# Patient Record
Sex: FEMALE | Race: WHITE | NOT HISPANIC OR LATINO
[De-identification: names, ages, dates, MRNs, and addresses within clinical notes are randomized per-mention and may not be internally consistent; named-entity substitution may affect disease eponyms.]

---

## 2020-05-20 ENCOUNTER — APPOINTMENT (OUTPATIENT)
Dept: RHEUMATOLOGY | Facility: CLINIC | Age: 70
End: 2020-05-20
Payer: MEDICARE

## 2020-05-20 VITALS
HEIGHT: 65 IN | SYSTOLIC BLOOD PRESSURE: 158 MMHG | DIASTOLIC BLOOD PRESSURE: 86 MMHG | OXYGEN SATURATION: 96 % | BODY MASS INDEX: 26.16 KG/M2 | WEIGHT: 157 LBS | HEART RATE: 78 BPM

## 2020-05-20 DIAGNOSIS — L40.50 ARTHROPATHIC PSORIASIS, UNSPECIFIED: ICD-10-CM

## 2020-05-20 PROCEDURE — 99204 OFFICE O/P NEW MOD 45 MIN: CPT

## 2020-05-22 ENCOUNTER — APPOINTMENT (OUTPATIENT)
Dept: RADIOLOGY | Facility: CLINIC | Age: 70
End: 2020-05-22
Payer: MEDICARE

## 2020-05-22 ENCOUNTER — OUTPATIENT (OUTPATIENT)
Dept: OUTPATIENT SERVICES | Facility: HOSPITAL | Age: 70
LOS: 1 days | End: 2020-05-22
Payer: MEDICARE

## 2020-05-22 ENCOUNTER — TRANSCRIPTION ENCOUNTER (OUTPATIENT)
Age: 70
End: 2020-05-22

## 2020-05-22 ENCOUNTER — RESULT REVIEW (OUTPATIENT)
Age: 70
End: 2020-05-22

## 2020-05-22 DIAGNOSIS — Z90.710 ACQUIRED ABSENCE OF BOTH CERVIX AND UTERUS: Chronic | ICD-10-CM

## 2020-05-22 DIAGNOSIS — L40.50 ARTHROPATHIC PSORIASIS, UNSPECIFIED: ICD-10-CM

## 2020-05-22 PROCEDURE — 73130 X-RAY EXAM OF HAND: CPT | Mod: 26,50

## 2020-05-22 PROCEDURE — 73130 X-RAY EXAM OF HAND: CPT

## 2020-05-22 PROCEDURE — 73630 X-RAY EXAM OF FOOT: CPT | Mod: 26,50

## 2020-05-22 PROCEDURE — 72040 X-RAY EXAM NECK SPINE 2-3 VW: CPT | Mod: 26

## 2020-05-22 PROCEDURE — 72110 X-RAY EXAM L-2 SPINE 4/>VWS: CPT

## 2020-05-22 PROCEDURE — 73630 X-RAY EXAM OF FOOT: CPT

## 2020-05-22 PROCEDURE — 72110 X-RAY EXAM L-2 SPINE 4/>VWS: CPT | Mod: 26

## 2020-05-22 PROCEDURE — 72040 X-RAY EXAM NECK SPINE 2-3 VW: CPT

## 2020-05-25 LAB
25(OH)D3 SERPL-MCNC: 50.8 NG/ML
ALBUMIN SERPL ELPH-MCNC: 4.8 G/DL
ALP BLD-CCNC: 126 U/L
ALT SERPL-CCNC: 29 U/L
ANA SER IF-ACNC: NEGATIVE
ANION GAP SERPL CALC-SCNC: 15 MMOL/L
AST SERPL-CCNC: 28 U/L
BASOPHILS # BLD AUTO: 0.07 K/UL
BASOPHILS NFR BLD AUTO: 1.1 %
BILIRUB SERPL-MCNC: 0.3 MG/DL
BUN SERPL-MCNC: 15 MG/DL
CALCIUM SERPL-MCNC: 10.2 MG/DL
CCP AB SER IA-ACNC: <8 UNITS
CHLORIDE SERPL-SCNC: 100 MMOL/L
CK SERPL-CCNC: 174 U/L
CO2 SERPL-SCNC: 26 MMOL/L
CREAT SERPL-MCNC: 0.87 MG/DL
CREAT SPEC-SCNC: 28 MG/DL
CREAT/PROT UR: NORMAL RATIO
CRP SERPL-MCNC: 0.13 MG/DL
DEPRECATED KAPPA LC FREE/LAMBDA SER: 0.91 RATIO
DSDNA AB SER-ACNC: <12 IU/ML
ENA RNP AB SER IA-ACNC: 0.2 AL
ENA SM AB SER IA-ACNC: <0.2 AL
ENA SS-A AB SER IA-ACNC: <0.2 AL
ENA SS-B AB SER IA-ACNC: <0.2 AL
EOSINOPHIL # BLD AUTO: 0.23 K/UL
EOSINOPHIL NFR BLD AUTO: 3.5 %
ERYTHROCYTE [SEDIMENTATION RATE] IN BLOOD BY WESTERGREN METHOD: 7 MM/HR
GLUCOSE SERPL-MCNC: 85 MG/DL
HAV IGM SER QL: NONREACTIVE
HBV CORE IGG+IGM SER QL: NONREACTIVE
HBV CORE IGM SER QL: NONREACTIVE
HBV SURFACE AG SER QL: NONREACTIVE
HCT VFR BLD CALC: 45.1 %
HCV AB SER QL: NONREACTIVE
HCV S/CO RATIO: 0.28 S/CO
HGB BLD-MCNC: 14.9 G/DL
IGA SER QL IEP: 181 MG/DL
IGG SER QL IEP: 1032 MG/DL
IGM SER QL IEP: 152 MG/DL
IMM GRANULOCYTES NFR BLD AUTO: 0.3 %
KAPPA LC CSF-MCNC: 1.64 MG/DL
KAPPA LC SERPL-MCNC: 1.5 MG/DL
LYMPHOCYTES # BLD AUTO: 2.61 K/UL
LYMPHOCYTES NFR BLD AUTO: 39.7 %
M TB IFN-G BLD-IMP: NEGATIVE
MAN DIFF?: NORMAL
MCHC RBC-ENTMCNC: 30.7 PG
MCHC RBC-ENTMCNC: 33 GM/DL
MCV RBC AUTO: 92.8 FL
MONOCYTES # BLD AUTO: 0.98 K/UL
MONOCYTES NFR BLD AUTO: 14.9 %
NEUTROPHILS # BLD AUTO: 2.67 K/UL
NEUTROPHILS NFR BLD AUTO: 40.5 %
PLATELET # BLD AUTO: 253 K/UL
POTASSIUM SERPL-SCNC: 4.2 MMOL/L
PROT SERPL-MCNC: 7.6 G/DL
PROT UR-MCNC: <4 MG/DL
QUANTIFERON TB PLUS MITOGEN MINUS NIL: 6.53 IU/ML
QUANTIFERON TB PLUS NIL: 0.01 IU/ML
QUANTIFERON TB PLUS TB1 MINUS NIL: 0 IU/ML
QUANTIFERON TB PLUS TB2 MINUS NIL: 0 IU/ML
RBC # BLD: 4.86 M/UL
RBC # FLD: 12 %
RF+CCP IGG SER-IMP: NEGATIVE
RHEUMATOID FACT SER QL: <10 IU/ML
SODIUM SERPL-SCNC: 141 MMOL/L
THYROGLOB AB SERPL-ACNC: <20 IU/ML
THYROPEROXIDASE AB SERPL IA-ACNC: <10 IU/ML
WBC # FLD AUTO: 6.58 K/UL

## 2020-05-26 LAB — HLA-B27 RELATED AG QL: NORMAL

## 2020-06-04 NOTE — PHYSICAL EXAM
[General Appearance - Alert] : alert [General Appearance - In No Acute Distress] : in no acute distress [Sclera] : the sclera and conjunctiva were normal [PERRL With Normal Accommodation] : pupils were equal in size, round, and reactive to light [Extraocular Movements] : extraocular movements were intact [Outer Ear] : the ears and nose were normal in appearance [Oropharynx] : the oropharynx was normal [Neck Cervical Mass (___cm)] : no neck mass was observed [Neck Appearance] : the appearance of the neck was normal [Jugular Venous Distention Increased] : there was no jugular-venous distention [Thyroid Diffuse Enlargement] : the thyroid was not enlarged [Thyroid Nodule] : there were no palpable thyroid nodules [Auscultation Breath Sounds / Voice Sounds] : lungs were clear to auscultation bilaterally [Heart Sounds] : normal S1 and S2 [Heart Rate And Rhythm] : heart rate was normal and rhythm regular [Heart Sounds Gallop] : no gallops [Murmurs] : no murmurs [Heart Sounds Pericardial Friction Rub] : no pericardial rub [Cervical Lymph Nodes Enlarged Posterior Bilaterally] : posterior cervical [Cervical Lymph Nodes Enlarged Anterior Bilaterally] : anterior cervical [Axillary Lymph Nodes Enlarged Bilaterally] : axillary [Supraclavicular Lymph Nodes Enlarged Bilaterally] : supraclavicular [No CVA Tenderness] : no ~M costovertebral angle tenderness [No Spinal Tenderness] : no spinal tenderness [FreeTextEntry1] : OA changes in the hands. TTP over the joints diffusely and on enthesis [Skin Color & Pigmentation] : normal skin color and pigmentation [Skin Turgor] : normal skin turgor [] : no rash [No Focal Deficits] : no focal deficits [Oriented To Time, Place, And Person] : oriented to person, place, and time [Impaired Insight] : insight and judgment were intact [Affect] : the affect was normal

## 2020-06-04 NOTE — HISTORY OF PRESENT ILLNESS
[FreeTextEntry1] : 69 year old female here for evaluation of PsA\par \galileo Has always had pain - started in the R hand, neck, shoulders, ankles and feet. She went to Dr. Collier in [place] - started Cimzia for PsA. Did labs and looking at her hands felt that she had PsA. Did not do x-rays. Took 4 injections over 5 months and did not think it was working. She felt there was not enough care being given. \par \par Patient transferred care to Dr. Franklin - did x-rays with possible evidence of PsA in 2019. Started Otezla 30 mg BID - didn't;t think it worked either. Switched to Taltz injection after 5 months - has taken 8 injections thus far. Last one was in 2020. At that time got blisters all over her stomach appr. 5 inches and painful. Feels that now that she iff the medication, her L hand seems to be worsening with pain and stiffness. Has had trouble getting medications due to expense of the drug and insurance coverage. \par \galileo Has pain in the neck and shoulder worse at night, wakes her up at night but not a/w morning stiffness. R hand >> L hand mostly over the CMC joints, PIPs, DIPs. Also c/o bilateral hip around the groin and buttocks. Did MRI of the hips which showed OA back in 2020. Toes and ankles feel numb when she wakes up in the morning. Also c/o severe fatigue all the time. Has flare ups with pain all over the body that occurs periodically - lasts 24 hours and resolved over time. No related to weather but could be related to stress. No dactylitis. Has not had any joint swelling. \par \galileo Does not have known h/o psoriasis. Many years ago, had a patch on the distal L LE which resolved on its own. Her doctor at the time thought it may have been psoriasis. Never had any rashes since then. Dr. Collier has suggested to the patient that she may have had patches on the elbow but the patient does not recall the lesions. \par \galileo Has had fractures with delayed healing. DEXA was done in 2020 and showed osteoporosis - was recommended to start Prolia but patient is hesitant. \par \par Ob/Gyn H: A0. Healthy pregnancy. menopause in  s/p hysterectomy for endometriosis. \par \par HCM: Mammogram: up to date; Colonoscopy: up to date \par \par  [Currently Experiencing] : currently [Anorexia] : no anorexia [Weight Loss] : no weight loss [Malaise] : malaise [Fever] : no fever [Chills] : no chills [Fatigue] : fatigue [Depression] : no depression [Malar Facial Rash] : no malar facial rash [Skin Lesions] : no lesions [Skin Nodules] : no skin nodules [Oral Ulcers] : no oral ulcers [Cough] : no cough [Dry Mouth] : no dry mouth [Dysphonia] : no dysphonia [Dysphagia] : no dysphagia [Shortness of Breath] : no shortness of breath [Chest Pain] : no chest pain [Arthralgias] : arthralgias [Joint Swelling] : no joint swelling [Joint Warmth] : no joint warmth [Joint Deformity] : no joint deformity [Decreased ROM] : decreased range of motion [Morning Stiffness] : no morning stiffness [Falls] : no falls [Difficulty Standing] : no difficulty standing [Difficulty Walking] : difficulty walking [Dyspnea] : no dyspnea [Myalgias] : no myalgias [Muscle Weakness] : no muscle weakness [Muscle Spasms] : no muscle spasms [Muscle Cramping] : no muscle cramping [Visual Changes] : no visual changes [Eye Pain] : no eye pain [Eye Redness] : no eye redness [Dry Eyes] : no dry eyes

## 2020-06-04 NOTE — ASSESSMENT
[FreeTextEntry1] : 19 year old female here for evaluation of possible PsA\par \par 1. Possible PsA: has one instance of rash (possibly psoriasis but never confirmed) and recent joint pains which may be related to OA or enthesitis or both. No evidence of inflammatory arthritis or dactylitis. Advised to bring in prior MRIs for review. Will get labs and x-rays today.  Depending on findings, will consider treatment strategy - likely will address her OA symptoms first. \par \par 2. Bone health: reviewed bone density with patient. Discussed the possibility of starting BPP - patient will think about it. \par \par Follow up in 2 months

## 2020-06-04 NOTE — REVIEW OF SYSTEMS
[As Noted in HPI] : as noted in HPI [Arthralgias] : arthralgias [Joint Pain] : joint pain [Joint Swelling] : no joint swelling [Joint Stiffness] : joint stiffness [Limb Pain] : no limb pain [Limb Swelling] : no limb swelling [Negative] : Heme/Lymph

## 2020-06-09 ENCOUNTER — APPOINTMENT (OUTPATIENT)
Dept: MRI IMAGING | Facility: CLINIC | Age: 70
End: 2020-06-09
Payer: MEDICARE

## 2020-06-09 ENCOUNTER — RESULT REVIEW (OUTPATIENT)
Age: 70
End: 2020-06-09

## 2020-06-09 ENCOUNTER — OUTPATIENT (OUTPATIENT)
Dept: OUTPATIENT SERVICES | Facility: HOSPITAL | Age: 70
LOS: 1 days | End: 2020-06-09
Payer: MEDICARE

## 2020-06-09 DIAGNOSIS — L40.50 ARTHROPATHIC PSORIASIS, UNSPECIFIED: ICD-10-CM

## 2020-06-09 DIAGNOSIS — Z90.710 ACQUIRED ABSENCE OF BOTH CERVIX AND UTERUS: Chronic | ICD-10-CM

## 2020-06-09 PROCEDURE — 73721 MRI JNT OF LWR EXTRE W/O DYE: CPT | Mod: 26,LT

## 2020-06-09 PROCEDURE — 73718 MRI LOWER EXTREMITY W/O DYE: CPT | Mod: 26,LT

## 2020-06-09 PROCEDURE — 73721 MRI JNT OF LWR EXTRE W/O DYE: CPT

## 2020-06-09 PROCEDURE — 73718 MRI LOWER EXTREMITY W/O DYE: CPT

## 2020-06-23 ENCOUNTER — APPOINTMENT (OUTPATIENT)
Dept: RHEUMATOLOGY | Facility: CLINIC | Age: 70
End: 2020-06-23
Payer: MEDICARE

## 2020-06-23 VITALS
HEART RATE: 71 BPM | DIASTOLIC BLOOD PRESSURE: 78 MMHG | BODY MASS INDEX: 25.83 KG/M2 | RESPIRATION RATE: 16 BRPM | OXYGEN SATURATION: 97 % | WEIGHT: 155 LBS | HEIGHT: 65 IN | TEMPERATURE: 97.5 F | SYSTOLIC BLOOD PRESSURE: 127 MMHG

## 2020-06-23 DIAGNOSIS — M25.50 PAIN IN UNSPECIFIED JOINT: ICD-10-CM

## 2020-06-23 PROCEDURE — 99214 OFFICE O/P EST MOD 30 MIN: CPT

## 2020-06-23 NOTE — HISTORY OF PRESENT ILLNESS
[___ Month(s) Ago] : [unfilled] month(s) ago [Currently Experiencing] : currently [Fatigue] : fatigue [Arthralgias] : arthralgias [Decreased ROM] : decreased range of motion [Morning Stiffness] : morning stiffness [Anorexia] : no anorexia [Weight Loss] : no weight loss [Malaise] : no malaise [Fever] : no fever [Chills] : no chills [Depression] : no depression [Malar Facial Rash] : no malar facial rash [Skin Lesions] : no lesions [Skin Nodules] : no skin nodules [Oral Ulcers] : no oral ulcers [Cough] : no cough [Dry Mouth] : no dry mouth [Dysphonia] : no dysphonia [Dysphagia] : no dysphagia [Shortness of Breath] : no shortness of breath [Chest Pain] : no chest pain [Joint Swelling] : no joint swelling [Joint Warmth] : no joint warmth [Joint Deformity] : no joint deformity [Falls] : no falls [Difficulty Standing] : no difficulty standing [Difficulty Walking] : no difficulty walking [Dyspnea] : no dyspnea [Myalgias] : no myalgias [Muscle Spasms] : no muscle spasms [Muscle Weakness] : no muscle weakness [Muscle Cramping] : no muscle cramping [Eye Pain] : no eye pain [Visual Changes] : no visual changes [Eye Redness] : no eye redness [Dry Eyes] : no dry eyes [FreeTextEntry1] : Persistent achiness in all the joints particularly in the ankles, feet and hands.  Symptoms are intermittent. Notices some swelling around the ankles and feet but not in the hand joints. Reviewed MRI of the ankle with the patient.

## 2020-06-23 NOTE — PHYSICAL EXAM
[General Appearance - Alert] : alert [General Appearance - In No Acute Distress] : in no acute distress [Sclera] : the sclera and conjunctiva were normal [PERRL With Normal Accommodation] : pupils were equal in size, round, and reactive to light [Extraocular Movements] : extraocular movements were intact [Outer Ear] : the ears and nose were normal in appearance [Oropharynx] : the oropharynx was normal [Neck Appearance] : the appearance of the neck was normal [Neck Cervical Mass (___cm)] : no neck mass was observed [Jugular Venous Distention Increased] : there was no jugular-venous distention [Thyroid Diffuse Enlargement] : the thyroid was not enlarged [Thyroid Nodule] : there were no palpable thyroid nodules [Auscultation Breath Sounds / Voice Sounds] : lungs were clear to auscultation bilaterally [Heart Rate And Rhythm] : heart rate was normal and rhythm regular [Heart Sounds] : normal S1 and S2 [Heart Sounds Gallop] : no gallops [Murmurs] : no murmurs [Heart Sounds Pericardial Friction Rub] : no pericardial rub [Cervical Lymph Nodes Enlarged Posterior Bilaterally] : posterior cervical [Cervical Lymph Nodes Enlarged Anterior Bilaterally] : anterior cervical [Supraclavicular Lymph Nodes Enlarged Bilaterally] : supraclavicular [Axillary Lymph Nodes Enlarged Bilaterally] : axillary [No CVA Tenderness] : no ~M costovertebral angle tenderness [No Spinal Tenderness] : no spinal tenderness [Skin Color & Pigmentation] : normal skin color and pigmentation [Skin Turgor] : normal skin turgor [] : no rash [No Focal Deficits] : no focal deficits [Oriented To Time, Place, And Person] : oriented to person, place, and time [Impaired Insight] : insight and judgment were intact [Affect] : the affect was normal [FreeTextEntry1] : OA changes in the hands. TTP over the joints diffusely and on enthesis

## 2020-06-23 NOTE — ASSESSMENT
[FreeTextEntry1] : 69 year old female here for evaluation of possible PsA\par \par 1. Possible PsA: has one instance of rash (possibly psoriasis but never confirmed) and recent joint pains which may be related to OA or enthesitis or both. No evidence of inflammatory arthritis or dactylitis. Will review the MRIs she brought in today. No erosions or joint damage on x-ray. On MRI of the L ankle and foot there is evidence of OA and entheseal changes that possibly reflects enthesitis but still unlikely to be PsA.  Will currently address her OA pain - start Gabapentin 100 mg TID and increase gradually to goal of 300 mg TID. Risks, benefits and side effects of the medication discussed with the patient in detail. Advised to call me if she experience any side effects\par \par 2. Bone health: reviewed bone density with patient. Discussed the possibility of starting BPP - patient will think about it. \par \par Follow up in 1 month

## 2020-06-23 NOTE — DATA REVIEWED
[FreeTextEntry1] : MRI L Foot (06/2020): MUSCLES AND TENDONS: The tendons are intact. No full-thickness tendon tear \par or retraction is noted. The intrinsic musculature the foot is preserved. No muscle edema noted. \par PLANTAR FASCIA: The distal plantar fascia is preserved. LIGAMENTS: Intact Lisfranc ligament. Mild attenuation of the medial plantar plate of the third digit. No acute plantar plate tear. CARTILAGE and SUBCHONDRAL BONE: Moderate to severe degenerative changes the first metatarsophalangeal joint. Several nonspecific cysts are seen at the medial first metatarsal head. Some of these may reflect periarticular erosions. There is cartilage loss and subchondral cystic change at the third TMT \par SYNOVIUM/JOINT FLUID: There is a small first metatarsophalangeal joint effusion. Small fluid present from the second through fifth metatarsophalangeal joints. Intermetatarsal bursitis noted at the first through third web spaces. MARROW: There is no fracture. NEUROVASCULAR STRUCTURES: Preserved PERIPHERAL/ SUBCUTANEOUS SOFT TISSUES: No significant soft tissue swelling. IMPRESSION: 1. Severe DJD at the first MTP. Additional degenerative changes as above. 2. Possible periarticular erosion at the first metatarsal head that can be seen with an inflammatory arthropathy such as gout. Correlate clinically. \par \par L ankle MRI (06/2020): Bones: There is mild subchondral signal abnormality at the lateral talar \par dome with an overlying chondral fissure. Joints: There is mild third and fourth tarsometatarsal osteoarthritis. There is scarring within the sinus Tarsi with a 1.8 x 1.0 cm ganglion cyst extending dorsal laterally which contacts the undersurface of the extensor hallucis longus and digitorum tendons (9, 17 and 5, 16 and 18). Ligaments: Anterior talofibular, calcaneofibular and posterior talofibular ligaments are intact. The distal syndesmotic ligaments are intact. The deltoid ligament is intact. Muscles and Tendons: There is short segment chronic longitudinal split tearing of peroneal brevis at the lateral malleolus (4, 20) with reconstitution at the level the distal calcaneus. The medial long flexor and anterior extensor tendons appear unremarkable. The Achilles tendon is intact. Plantar Fascia: Minimal intermediate T2 signal within the middle cord of the plantar fascia at the origin laterally without thickening of the middle cord likely related to a small subacute to chronic tear. Thickening of the middle cord of the plantar fascia 2.6 cm from the origin which is also chronic. There is no adjacent soft tissue edema. Neurovascular: Normal. \par Soft Tissues: Mild lateral greater than medial subcutaneous edema. Impression: Scarring at the sinus Tarsi with a 1.8 x 1.0 cm ganglion cyst extending dorsal laterally which contacts the undersurface of the extensor hallucis longus and extensor digitorum tendons. Chronic short segment longitudinal split tear of peroneal brevis. Intermediate intrasubstance signal at the origin the middle cord of the plantar fascia with mild thickening distally which is likely subacute to chronic and chronic respectively. Chondral fissure with mild subchondral signal abnormality at the lateral talar dome. Mild third and fourth tarsometatarsal osteoarthrosis. \par \par Bilateral foor x-ray (05/2020): No fractures or dislocations. Tarsometatarsal alignment maintained without evidence for a Lisfranc injury. Bilateral hallux valgus deformities with associated bunions. Suggestion of a \par tiny well marginated erosion along left 1st MTP distal lateral articular margin on the frontal view. Otherwise preserved visualized joint spaces and no additional suspected joint margin erosions. No lytic or blastic lesions. \par \par Hand x-ray (05/2020): No fractures or dislocations. Varying degrees of osteoarthritis involving the bilateral STT 1st CMC and right IP joints. Preserved remaining joint spaces and no joint margin erosions. \par Carpal bones normally aligned. Neutral ulnar variance. Generalized osteopenia otherwise no discrete lytic or blastic lesions. Cervical Spine x-ray (05/2020): No compression fractures. Multilevel mid and lower cervical predominant degenerative disease manifest by varying degrees of disc space narrowing with disc margin osteophyte formation and posterior facet arthrosis. Degree of degenerative disc changes \par most severe at C5-C6 C6-C7 and C7-T1 levels. Straightened lordosis could be related to muscle spasm and/or the underlying degenerative change. Slight C3 on C4 and C4 on C5 anterolisthesis. Remaining vertebral body alignment maintained. Prevertebral soft tissues and predental interval within normal limits. \par Atlantoaxial and posterior facet alignment maintained. Intact odontoid. Generalized osteopenia otherwise no discrete lytic or blastic lesions. Clear visualized lung apices and midline normal caliber trachea. \par \par Lumbar spine x-ray (05/2020): Broad slight lumbar levocurvature. No compression fractures. Multilevel degenerative disease manifest by varying degrees of disc space narrowing with disc margin osteophyte formation and posterior facet arthrosis. Grade 1 anterolisthesis of L3 on L4, grade 1-2 anterolisthesis of L4 on L5, and slight L1 on L2 and L2 on L3 retrolistheses however without associated spondylolysis defects. Remaining vertebral body alignment maintained. Unremarkable SI joints and partially visualized hips. No lytic or blastic lesions. Right upper quadrant surgical clips present. \par

## 2020-06-23 NOTE — REVIEW OF SYSTEMS
[As Noted in HPI] : as noted in HPI [Arthralgias] : arthralgias [Joint Pain] : joint pain [Joint Stiffness] : joint stiffness [Negative] : Heme/Lymph [Joint Swelling] : no joint swelling [Limb Pain] : no limb pain [Limb Swelling] : no limb swelling

## 2020-06-29 ENCOUNTER — APPOINTMENT (OUTPATIENT)
Dept: RHEUMATOLOGY | Facility: CLINIC | Age: 70
End: 2020-06-29

## 2020-07-07 ENCOUNTER — APPOINTMENT (OUTPATIENT)
Dept: RHEUMATOLOGY | Facility: CLINIC | Age: 70
End: 2020-07-07

## 2020-07-29 ENCOUNTER — APPOINTMENT (OUTPATIENT)
Dept: RHEUMATOLOGY | Facility: CLINIC | Age: 70
End: 2020-07-29
Payer: MEDICARE

## 2020-07-29 VITALS
WEIGHT: 157 LBS | DIASTOLIC BLOOD PRESSURE: 98 MMHG | SYSTOLIC BLOOD PRESSURE: 153 MMHG | HEART RATE: 82 BPM | BODY MASS INDEX: 26.16 KG/M2 | OXYGEN SATURATION: 98 % | HEIGHT: 65 IN | TEMPERATURE: 97.7 F

## 2020-07-29 PROCEDURE — 99214 OFFICE O/P EST MOD 30 MIN: CPT

## 2020-07-29 RX ORDER — GABAPENTIN 100 MG/1
100 CAPSULE ORAL
Qty: 90 | Refills: 1 | Status: DISCONTINUED | COMMUNITY
Start: 2020-06-23 | End: 2020-07-29

## 2020-07-29 NOTE — HISTORY OF PRESENT ILLNESS
[Currently Experiencing] : currently [___ Month(s) Ago] : [unfilled] month(s) ago [Fatigue] : fatigue [Arthralgias] : arthralgias [Decreased ROM] : decreased range of motion [Morning Stiffness] : morning stiffness [Anorexia] : no anorexia [Weight Loss] : no weight loss [Malaise] : no malaise [Fever] : no fever [Chills] : no chills [Depression] : no depression [Malar Facial Rash] : no malar facial rash [Skin Lesions] : no lesions [Skin Nodules] : no skin nodules [Oral Ulcers] : no oral ulcers [Cough] : no cough [Dry Mouth] : no dry mouth [Dysphonia] : no dysphonia [Shortness of Breath] : no shortness of breath [Dysphagia] : no dysphagia [Chest Pain] : no chest pain [Joint Swelling] : no joint swelling [Joint Deformity] : no joint deformity [Joint Warmth] : no joint warmth [Falls] : no falls [Difficulty Walking] : no difficulty walking [Difficulty Standing] : no difficulty standing [Dyspnea] : no dyspnea [Myalgias] : no myalgias [Muscle Weakness] : no muscle weakness [Muscle Cramping] : no muscle cramping [Muscle Spasms] : no muscle spasms [Eye Pain] : no eye pain [Visual Changes] : no visual changes [Eye Redness] : no eye redness [Dry Eyes] : no dry eyes [FreeTextEntry1] : Could not tolerate Gabapentin. COntinues ot have pain in the joints but has been using CBD oil which has helped with the pain. Also exercises twice a week.

## 2020-07-29 NOTE — PHYSICAL EXAM
[General Appearance - Alert] : alert [PERRL With Normal Accommodation] : pupils were equal in size, round, and reactive to light [Sclera] : the sclera and conjunctiva were normal [General Appearance - In No Acute Distress] : in no acute distress [Extraocular Movements] : extraocular movements were intact [Outer Ear] : the ears and nose were normal in appearance [Oropharynx] : the oropharynx was normal [Neck Appearance] : the appearance of the neck was normal [Jugular Venous Distention Increased] : there was no jugular-venous distention [Neck Cervical Mass (___cm)] : no neck mass was observed [Thyroid Diffuse Enlargement] : the thyroid was not enlarged [Auscultation Breath Sounds / Voice Sounds] : lungs were clear to auscultation bilaterally [Thyroid Nodule] : there were no palpable thyroid nodules [Heart Rate And Rhythm] : heart rate was normal and rhythm regular [Heart Sounds Gallop] : no gallops [Heart Sounds] : normal S1 and S2 [Murmurs] : no murmurs [Heart Sounds Pericardial Friction Rub] : no pericardial rub [Cervical Lymph Nodes Enlarged Posterior Bilaterally] : posterior cervical [Cervical Lymph Nodes Enlarged Anterior Bilaterally] : anterior cervical [Axillary Lymph Nodes Enlarged Bilaterally] : axillary [No CVA Tenderness] : no ~M costovertebral angle tenderness [Supraclavicular Lymph Nodes Enlarged Bilaterally] : supraclavicular [No Spinal Tenderness] : no spinal tenderness [Skin Color & Pigmentation] : normal skin color and pigmentation [Skin Turgor] : normal skin turgor [] : no rash [Impaired Insight] : insight and judgment were intact [No Focal Deficits] : no focal deficits [Oriented To Time, Place, And Person] : oriented to person, place, and time [Affect] : the affect was normal [FreeTextEntry1] : OA changes in the hands. TTP over the joints diffusely and on enthesis

## 2020-07-29 NOTE — ASSESSMENT
[FreeTextEntry1] : 70 year old female here for evaluation of possible PsA\par \par 1. Possible PsA: has one instance of rash (possibly psoriasis but never confirmed) and recent joint pains which may be related to OA or enthesitis or both. No evidence of inflammatory arthritis or dactylitis. Will review the MRIs she brought in today. No erosions or joint damage on x-ray. On MRI of the L ankle and foot there is evidence of OA and entheseal changes that possibly reflects enthesitis but still unlikely to be PsA.  Trial of steroid taper. If dramatic improvement, will consider DMARD therapy. Will also address her OA pain - increase Amitriptyline to 75 mg daily. Risks, benefits and side effects of the medication discussed with the patient in detail. Advised to call me if she experience any side effects\par \par 2. Bone health: reviewed bone density with patient. Discussed the possibility of starting BPP - patient will think about it. \par \par Follow up in 2 months

## 2020-07-29 NOTE — REVIEW OF SYSTEMS
[As Noted in HPI] : as noted in HPI [Arthralgias] : arthralgias [Joint Stiffness] : joint stiffness [Joint Pain] : joint pain [Negative] : Heme/Lymph [Joint Swelling] : no joint swelling [Limb Swelling] : no limb swelling [Limb Pain] : no limb pain

## 2020-10-07 ENCOUNTER — RX RENEWAL (OUTPATIENT)
Age: 70
End: 2020-10-07

## 2020-10-22 ENCOUNTER — RX RENEWAL (OUTPATIENT)
Age: 70
End: 2020-10-22

## 2020-11-30 ENCOUNTER — APPOINTMENT (OUTPATIENT)
Dept: RHEUMATOLOGY | Facility: CLINIC | Age: 70
End: 2020-11-30

## 2020-12-23 ENCOUNTER — RX RENEWAL (OUTPATIENT)
Age: 70
End: 2020-12-23

## 2020-12-23 RX ORDER — AMITRIPTYLINE HYDROCHLORIDE 25 MG/1
25 TABLET, FILM COATED ORAL
Qty: 30 | Refills: 1 | Status: ACTIVE | COMMUNITY
Start: 2020-07-29 | End: 1900-01-01

## 2021-01-05 ENCOUNTER — APPOINTMENT (OUTPATIENT)
Dept: RHEUMATOLOGY | Facility: CLINIC | Age: 71
End: 2021-01-05
Payer: MEDICARE

## 2021-01-19 ENCOUNTER — APPOINTMENT (OUTPATIENT)
Dept: RHEUMATOLOGY | Facility: CLINIC | Age: 71
End: 2021-01-19
Payer: MEDICARE

## 2021-01-19 VITALS
HEIGHT: 65 IN | OXYGEN SATURATION: 98 % | BODY MASS INDEX: 26.33 KG/M2 | DIASTOLIC BLOOD PRESSURE: 82 MMHG | WEIGHT: 158 LBS | SYSTOLIC BLOOD PRESSURE: 126 MMHG | HEART RATE: 84 BPM | RESPIRATION RATE: 16 BRPM | TEMPERATURE: 97.4 F

## 2021-01-19 DIAGNOSIS — M19.90 UNSPECIFIED OSTEOARTHRITIS, UNSPECIFIED SITE: ICD-10-CM

## 2021-01-19 PROCEDURE — 99213 OFFICE O/P EST LOW 20 MIN: CPT

## 2021-01-19 RX ORDER — PREDNISONE 5 MG/1
5 TABLET ORAL
Qty: 60 | Refills: 0 | Status: DISCONTINUED | COMMUNITY
Start: 2020-07-29 | End: 2021-01-19

## 2021-01-19 RX ORDER — IBUPROFEN 800 MG/1
800 TABLET, FILM COATED ORAL
Qty: 28 | Refills: 0 | Status: ACTIVE | COMMUNITY
Start: 2020-09-14

## 2021-01-19 RX ORDER — LEVOTHYROXINE SODIUM 0.07 MG/1
75 TABLET ORAL
Qty: 60 | Refills: 0 | Status: ACTIVE | COMMUNITY
Start: 2020-12-19

## 2021-04-06 ENCOUNTER — APPOINTMENT (OUTPATIENT)
Dept: RHEUMATOLOGY | Facility: CLINIC | Age: 71
End: 2021-04-06
Payer: MEDICARE

## 2021-04-13 ENCOUNTER — APPOINTMENT (OUTPATIENT)
Dept: RHEUMATOLOGY | Facility: CLINIC | Age: 71
End: 2021-04-13
Payer: MEDICARE

## 2021-04-13 VITALS
BODY MASS INDEX: 25.99 KG/M2 | SYSTOLIC BLOOD PRESSURE: 160 MMHG | TEMPERATURE: 97.2 F | DIASTOLIC BLOOD PRESSURE: 71 MMHG | WEIGHT: 156 LBS | OXYGEN SATURATION: 97 % | HEIGHT: 65 IN | HEART RATE: 77 BPM

## 2021-04-13 PROCEDURE — 99214 OFFICE O/P EST MOD 30 MIN: CPT

## 2021-04-13 RX ORDER — AZITHROMYCIN 250 MG/1
250 TABLET, FILM COATED ORAL
Qty: 6 | Refills: 0 | Status: DISCONTINUED | COMMUNITY
Start: 2020-11-09 | End: 2021-04-13

## 2021-04-13 NOTE — ASSESSMENT
[FreeTextEntry1] : 70 year old female here for follow up for OA and possible PsA\par \par 1. Possible PsA: has one instance of rash (possibly psoriasis but never confirmed) and recent joint pains which may be related to OA or enthesitis or both. No evidence of inflammatory arthritis or dactylitis. No erosions or joint damage on x-ray. On MRI of the L ankle and foot there is evidence of OA and entheseal changes that possibly reflects enthesitis.  Has had some improvement with Xeljanz 5 mg BID but continues to have flares on and off (appr. 2 days per month). Also on Amitriptyline 75 mg daily to address her OA pain. Will continue the same for now. Advised to keep a log of her flares and try Prednisone 10 mg x 2 days around her flare. Will try to get approval for Stelara. Repeat labs today - CBC, CMP and lipid panel. \par \par 2. Bone health: reviewed bone density with patient. Discussed the possibility of starting BPP - patient wants to defer for now. Will revisit in 3 months\par \par Follow up in 3 months

## 2021-04-13 NOTE — PHYSICAL EXAM
[General Appearance - Alert] : alert [General Appearance - In No Acute Distress] : in no acute distress [Sclera] : the sclera and conjunctiva were normal [PERRL With Normal Accommodation] : pupils were equal in size, round, and reactive to light [Extraocular Movements] : extraocular movements were intact [Outer Ear] : the ears and nose were normal in appearance [Oropharynx] : the oropharynx was normal [Neck Appearance] : the appearance of the neck was normal [Neck Cervical Mass (___cm)] : no neck mass was observed [Jugular Venous Distention Increased] : there was no jugular-venous distention [Thyroid Diffuse Enlargement] : the thyroid was not enlarged [Thyroid Nodule] : there were no palpable thyroid nodules [Auscultation Breath Sounds / Voice Sounds] : lungs were clear to auscultation bilaterally [Heart Rate And Rhythm] : heart rate was normal and rhythm regular [Heart Sounds] : normal S1 and S2 [Heart Sounds Gallop] : no gallops [Murmurs] : no murmurs [Heart Sounds Pericardial Friction Rub] : no pericardial rub [Cervical Lymph Nodes Enlarged Posterior Bilaterally] : posterior cervical [Cervical Lymph Nodes Enlarged Anterior Bilaterally] : anterior cervical [Supraclavicular Lymph Nodes Enlarged Bilaterally] : supraclavicular [Axillary Lymph Nodes Enlarged Bilaterally] : axillary [No CVA Tenderness] : no ~M costovertebral angle tenderness [No Spinal Tenderness] : no spinal tenderness [Skin Color & Pigmentation] : normal skin color and pigmentation [Skin Turgor] : normal skin turgor [] : no rash [No Focal Deficits] : no focal deficits [Oriented To Time, Place, And Person] : oriented to person, place, and time [Impaired Insight] : insight and judgment were intact [Affect] : the affect was normal [FreeTextEntry1] : OA changes in the hands. TTP over the joints of the hands. Bilateral trochanteric bursa tenderness

## 2021-04-13 NOTE — HISTORY OF PRESENT ILLNESS
[___ Month(s) Ago] : [unfilled] month(s) ago [New/worsening skin psoriasis] : new/worsening skin psoriasis [Joint Pain] : joint pain [Joint stiffness] : joint stiffness [Currently Experiencing] : currently [Fatigue] : fatigue [Arthralgias] : arthralgias [Decreased ROM] : decreased range of motion [FreeTextEntry1] : 2 weeks ago was in so much pain that she felt her body was going to bust - pain in the neck, headache for 1 day. Symptoms improved in 1 day. Fatigue is getting worse. Stopped Xeljanz for a couple days then started it back again. \par \par Hands may have gotten a bit better and hips also got better.  [Radiographic damage] : no radiographic damage [Anorexia] : no anorexia [Weight Loss] : no weight loss [Malaise] : no malaise [Fever] : no fever [Chills] : no chills [Depression] : no depression [Malar Facial Rash] : no malar facial rash [Skin Lesions] : no lesions [Skin Nodules] : no skin nodules [Oral Ulcers] : no oral ulcers [Cough] : no cough [Dry Mouth] : no dry mouth [Dysphonia] : no dysphonia [Dysphagia] : no dysphagia [Shortness of Breath] : no shortness of breath [Chest Pain] : no chest pain [Joint Swelling] : no joint swelling [Joint Warmth] : no joint warmth [Joint Deformity] : no joint deformity [Morning Stiffness] : no morning stiffness [Falls] : no falls [Difficulty Standing] : no difficulty standing [Difficulty Walking] : no difficulty walking [Dyspnea] : no dyspnea [Myalgias] : no myalgias [Muscle Weakness] : no muscle weakness [Muscle Spasms] : no muscle spasms [Muscle Cramping] : no muscle cramping [Visual Changes] : no visual changes [Eye Pain] : no eye pain [Eye Redness] : no eye redness [Dry Eyes] : no dry eyes [TextBox_2] : 2018 [TextBox_9] : Otezla [TextBox_13] : Cimzia, Taltz [TextBox_15] : Xeljanz

## 2021-04-20 LAB
25(OH)D3 SERPL-MCNC: 37.6 NG/ML
ALBUMIN SERPL ELPH-MCNC: 4.6 G/DL
ALP BLD-CCNC: 114 U/L
ALT SERPL-CCNC: 27 U/L
ANION GAP SERPL CALC-SCNC: 15 MMOL/L
AST SERPL-CCNC: 29 U/L
BASOPHILS # BLD AUTO: 0.08 K/UL
BASOPHILS NFR BLD AUTO: 0.9 %
BILIRUB SERPL-MCNC: 0.2 MG/DL
BUN SERPL-MCNC: 17 MG/DL
CALCIUM SERPL-MCNC: 10 MG/DL
CHLORIDE SERPL-SCNC: 99 MMOL/L
CHOLEST SERPL-MCNC: 144 MG/DL
CO2 SERPL-SCNC: 25 MMOL/L
CREAT SERPL-MCNC: 0.8 MG/DL
CRP SERPL-MCNC: <3 MG/L
EOSINOPHIL # BLD AUTO: 0.28 K/UL
EOSINOPHIL NFR BLD AUTO: 3.3 %
ERYTHROCYTE [SEDIMENTATION RATE] IN BLOOD BY WESTERGREN METHOD: 2 MM/HR
GLUCOSE SERPL-MCNC: 68 MG/DL
HCT VFR BLD CALC: 42.9 %
HDLC SERPL-MCNC: 52 MG/DL
HGB BLD-MCNC: 14 G/DL
IMM GRANULOCYTES NFR BLD AUTO: 0.4 %
LDLC SERPL CALC-MCNC: 78 MG/DL
LYMPHOCYTES # BLD AUTO: 2.97 K/UL
LYMPHOCYTES NFR BLD AUTO: 34.8 %
MAN DIFF?: NORMAL
MCHC RBC-ENTMCNC: 30.8 PG
MCHC RBC-ENTMCNC: 32.6 GM/DL
MCV RBC AUTO: 94.3 FL
MONOCYTES # BLD AUTO: 1.29 K/UL
MONOCYTES NFR BLD AUTO: 15.1 %
NEUTROPHILS # BLD AUTO: 3.89 K/UL
NEUTROPHILS NFR BLD AUTO: 45.5 %
NONHDLC SERPL-MCNC: 92 MG/DL
PLATELET # BLD AUTO: 279 K/UL
POTASSIUM SERPL-SCNC: 4.4 MMOL/L
PROT SERPL-MCNC: 7 G/DL
RBC # BLD: 4.55 M/UL
RBC # FLD: 12.6 %
SODIUM SERPL-SCNC: 139 MMOL/L
TRIGL SERPL-MCNC: 70 MG/DL
WBC # FLD AUTO: 8.54 K/UL

## 2021-12-26 ENCOUNTER — EMERGENCY (EMERGENCY)
Facility: HOSPITAL | Age: 71
LOS: 1 days | End: 2021-12-26
Attending: EMERGENCY MEDICINE
Payer: MEDICARE

## 2021-12-26 VITALS
DIASTOLIC BLOOD PRESSURE: 81 MMHG | HEART RATE: 97 BPM | WEIGHT: 154.98 LBS | SYSTOLIC BLOOD PRESSURE: 158 MMHG | TEMPERATURE: 98 F | RESPIRATION RATE: 18 BRPM | HEIGHT: 65 IN | OXYGEN SATURATION: 99 %

## 2021-12-26 VITALS
RESPIRATION RATE: 18 BRPM | TEMPERATURE: 98 F | DIASTOLIC BLOOD PRESSURE: 74 MMHG | SYSTOLIC BLOOD PRESSURE: 144 MMHG | HEART RATE: 88 BPM | OXYGEN SATURATION: 98 %

## 2021-12-26 DIAGNOSIS — Z90.710 ACQUIRED ABSENCE OF BOTH CERVIX AND UTERUS: Chronic | ICD-10-CM

## 2021-12-26 PROCEDURE — 71101 X-RAY EXAM UNILAT RIBS/CHEST: CPT

## 2021-12-26 PROCEDURE — 71101 X-RAY EXAM UNILAT RIBS/CHEST: CPT | Mod: 26

## 2021-12-26 PROCEDURE — 99283 EMERGENCY DEPT VISIT LOW MDM: CPT | Mod: 25

## 2021-12-26 PROCEDURE — 99283 EMERGENCY DEPT VISIT LOW MDM: CPT

## 2021-12-26 NOTE — ED PROVIDER NOTE - PATIENT PORTAL LINK FT
You can access the FollowMyHealth Patient Portal offered by [facility] by registering at the following website: [email or web address]. By joining [email or web address]’s FollowMyHealth portal, you will also be able to view your health information using other applications (apps) compatible with our system.

## 2021-12-26 NOTE — ED ADULT NURSE NOTE - NSIMPLEMENTINTERV_GEN_ALL_ED
Implemented All Universal Safety Interventions:  [place] to call system. Call bell, personal items and telephone within reach. Instruct patient to call for assistance. Room bathroom lighting operational. Non-slip footwear when patient is off stretcher. Physically safe environment: no spills, clutter or unnecessary equipment. Stretcher in lowest position, wheels locked, appropriate side rails in place.

## 2021-12-26 NOTE — ED PROVIDER NOTE - OBJECTIVE STATEMENT
pt c/o left rib pain s/p injury 12/22. pt relates tripped when trying to climb over dog gate in house, hitting left ribs on the gate. no head inj, ha, loc, d/n/v, sob, abd pain, weakness, numbness, neck por back pain. pt declining pain meds  pmd - esmer pt c/o left rib pain s/p injury 12/22. pt relates tripped when trying to climb over dog gate in house, hitting left ribs on the gate. no head inj, ha, loc, d/n/v, sob, abd pain, weakness, numbness, neck or back pain. pt declining pain meds  pmd - esmer

## 2021-12-26 NOTE — ED PROVIDER NOTE - CARE PROVIDER_API CALL
Pio Price [place] [address]  Phone: [phone]  Fax: [phone]  Follow Up Time: 1-3 Days

## 2022-01-19 ENCOUNTER — EMERGENCY (EMERGENCY)
Facility: HOSPITAL | Age: 72
LOS: 1 days | Discharge: HOME | End: 2022-01-19
Attending: EMERGENCY MEDICINE | Admitting: EMERGENCY MEDICINE
Payer: MEDICARE

## 2022-01-19 VITALS
SYSTOLIC BLOOD PRESSURE: 175 MMHG | HEART RATE: 81 BPM | RESPIRATION RATE: 15 BRPM | OXYGEN SATURATION: 97 % | DIASTOLIC BLOOD PRESSURE: 73 MMHG | HEIGHT: 65 IN | TEMPERATURE: 98 F

## 2022-01-19 DIAGNOSIS — Z90.710 ACQUIRED ABSENCE OF BOTH CERVIX AND UTERUS: Chronic | ICD-10-CM

## 2022-01-19 PROCEDURE — 71101 X-RAY EXAM UNILAT RIBS/CHEST: CPT | Mod: 26

## 2022-01-19 PROCEDURE — 70450 CT HEAD/BRAIN W/O DYE: CPT | Mod: MA

## 2022-01-19 PROCEDURE — 71101 X-RAY EXAM UNILAT RIBS/CHEST: CPT

## 2022-01-19 PROCEDURE — 12011 RPR F/E/E/N/L/M 2.5 CM/<: CPT

## 2022-01-19 PROCEDURE — 99284 EMERGENCY DEPT VISIT MOD MDM: CPT | Mod: 25

## 2022-01-19 PROCEDURE — 70450 CT HEAD/BRAIN W/O DYE: CPT | Mod: 26,MA

## 2022-01-19 RX ORDER — ACETAMINOPHEN 500 MG
975 TABLET ORAL ONCE
Refills: 0 | Status: COMPLETED | OUTPATIENT
Start: 2022-01-19 | End: 2022-01-19

## 2022-01-19 RX ORDER — ONDANSETRON 8 MG/1
4 TABLET, FILM COATED ORAL ONCE
Refills: 0 | Status: COMPLETED | OUTPATIENT
Start: 2022-01-19 | End: 2022-01-19

## 2022-01-19 RX ADMIN — Medication 975 MILLIGRAM(S): at 15:15

## 2022-01-19 RX ADMIN — Medication 975 MILLIGRAM(S): at 16:48

## 2022-01-19 RX ADMIN — ONDANSETRON 4 MILLIGRAM(S): 8 TABLET, FILM COATED ORAL at 16:48

## 2022-01-19 NOTE — ED ADULT NURSE NOTE - OBJECTIVE STATEMENT
Pt A/O x 4, in no apparent distress, came in s/p fall with laceration to left side of forehead no active bleeding noted.

## 2022-01-19 NOTE — ED PROVIDER NOTE - PHYSICAL EXAMINATION
Gen:  alert, awake, no acute distress  HEENT:   L forehead w 2cm laceration, airway clear, pupils equal and round  CV:  rrr, nl S1, S2, no m/r/g  Pulm:  lungs CTA b/l  Abd: s/nt/nd, +BS  Ext:  moving all extremities  Neuro:  grossly intact, no focal deficits  Skin:  clear, dry, intact  Psych: AOx3, normal affect, no apparent risk to self or others

## 2022-01-19 NOTE — ED ADULT NURSE NOTE - HOW OFTEN DO YOU HAVE A DRINK CONTAINING ALCOHOL?
Khoury.  

[id number] Subsequent Hospital Care, Georgie Beard MD Jan 8, 2018 08:51
Never

## 2022-01-19 NOTE — ED PROVIDER NOTE - NSFOLLOWUPINSTRUCTIONS_ED_ALL_ED_FT
Return to ER or follow up with your primary care in 7 to 10 days for suture removal.        WHAT YOU NEED TO KNOW:    Stitches, or sutures, are used to close cuts and wounds on the skin. Stitches need to be removed after your wound has healed.             DISCHARGE INSTRUCTIONS:    Return to the emergency department if:   •Your stitches come apart.      •Blood soaks through your bandages.      •You suddenly cannot move your injured joint.      •You have sudden numbness around your wound.      •You see red streaks coming from your wound.      Contact your healthcare provider if:   •You have a fever and chills.      •Your wound is red, warm, swollen, or leaking pus.      •There is a bad smell coming from your wound.      •You have increased pain in the wound area.      •You have questions or concerns about your condition or care.      Care for your stitches:   •Protect the stitches. You may need to cover your stitches with a bandage for 24 to 48 hours, or as directed. Do not bump or hit the suture area. This could open the wound. Do not trim or shorten the ends of your stitches. If they rub on your clothing, put a gauze bandage between the stitches and your clothes.      •Clean the area as directed. Carefully wash your wound with soap and water. For mouth and lip wounds, rinse your mouth after meals and at bedtime. Ask your healthcare provider what to use to rinse your mouth. If you have a scalp wound, you may gently wash your hair every 2 days with mild shampoo. Do not use hair products, such as hair spray. Check your wound for signs of infection when you clean it. Signs include redness, swelling, and pus.      •Keep the area dry as directed. Wait 12 to 24 hours after you receive your stitches before you take a shower. Take showers instead of baths. Do not take a bath or swim. Your healthcare provider will give you instructions for bathing with your stitches.      Help your wound heal:   •Elevate your wound. Keep your wound above the level of your heart as often as you can. This will help decrease swelling and pain. Prop the area on pillows or blankets, if possible, to keep it elevated comfortably.      •Limit activity. Do not stretch the skin around your wound. This will help prevent bleeding and swelling.      Follow up with your healthcare provider as directed: You may need to return to have your stitches removed. Write down your questions so you remember to ask them during your visits.        © Copyright [employer] 2022           back to top                          © Copyright [employer] 2022

## 2022-01-19 NOTE — ED PROVIDER NOTE - PATIENT PORTAL LINK FT
You can access the FollowMyHealth Patient Portal offered by [facility] by registering at the following website: [email or web address]. By joining [employer]’s FollowMyHealth portal, you will also be able to view your health information using other applications (apps) compatible with our system.

## 2022-01-19 NOTE — ED PROVIDER NOTE - OBJECTIVE STATEMENT
pt s/p trip and fall forward, + head trauma and L rib pain, denies any LOC, + L forehead laceration and mild nausea, denies any neck pain, no AC

## 2022-01-20 NOTE — ED POST DISCHARGE NOTE - DETAILS
discussed abnormal rib x ray findings. discussed follow up and supportive treatment. discussed need for "deep breathing" exercises. advised return precautions.

## 2022-04-08 ENCOUNTER — RX RENEWAL (OUTPATIENT)
Age: 72
End: 2022-04-08

## 2022-05-05 ENCOUNTER — RX RENEWAL (OUTPATIENT)
Age: 72
End: 2022-05-05

## 2022-06-02 ENCOUNTER — RX RENEWAL (OUTPATIENT)
Age: 72
End: 2022-06-02

## 2022-06-16 ENCOUNTER — APPOINTMENT (OUTPATIENT)
Dept: ORTHOPEDIC SURGERY | Facility: CLINIC | Age: 72
End: 2022-06-16

## 2022-06-24 ENCOUNTER — RX RENEWAL (OUTPATIENT)
Age: 72
End: 2022-06-24

## 2022-09-13 ENCOUNTER — APPOINTMENT (OUTPATIENT)
Dept: RHEUMATOLOGY | Facility: CLINIC | Age: 72
End: 2022-09-13

## 2022-09-13 VITALS
RESPIRATION RATE: 16 BRPM | WEIGHT: 155 LBS | BODY MASS INDEX: 25.83 KG/M2 | HEART RATE: 72 BPM | TEMPERATURE: 97 F | HEIGHT: 65 IN | DIASTOLIC BLOOD PRESSURE: 70 MMHG | OXYGEN SATURATION: 97 % | SYSTOLIC BLOOD PRESSURE: 146 MMHG

## 2022-09-13 PROCEDURE — 99214 OFFICE O/P EST MOD 30 MIN: CPT

## 2022-09-13 RX ORDER — USTEKINUMAB 45 MG/.5ML
45 INJECTION, SOLUTION SUBCUTANEOUS
Qty: 2 | Refills: 2 | Status: DISCONTINUED | COMMUNITY
Start: 2021-04-13 | End: 2022-09-13

## 2022-09-13 RX ORDER — DICLOFENAC SODIUM 1% 10 MG/G
1 GEL TOPICAL
Qty: 1 | Refills: 2 | Status: ACTIVE | COMMUNITY
Start: 2021-01-19 | End: 1900-01-01

## 2022-09-13 NOTE — PHYSICAL EXAM
[General Appearance - Alert] : alert [General Appearance - In No Acute Distress] : in no acute distress [Sclera] : the sclera and conjunctiva were normal [PERRL With Normal Accommodation] : pupils were equal in size, round, and reactive to light [Extraocular Movements] : extraocular movements were intact [Outer Ear] : the ears and nose were normal in appearance [Oropharynx] : the oropharynx was normal [Neck Appearance] : the appearance of the neck was normal [Neck Cervical Mass (___cm)] : no neck mass was observed [Jugular Venous Distention Increased] : there was no jugular-venous distention [Thyroid Diffuse Enlargement] : the thyroid was not enlarged [Thyroid Nodule] : there were no palpable thyroid nodules [Auscultation Breath Sounds / Voice Sounds] : lungs were clear to auscultation bilaterally [Heart Rate And Rhythm] : heart rate was normal and rhythm regular [Heart Sounds] : normal S1 and S2 [Heart Sounds Gallop] : no gallops [Murmurs] : no murmurs [Heart Sounds Pericardial Friction Rub] : no pericardial rub [Cervical Lymph Nodes Enlarged Posterior Bilaterally] : posterior cervical [Cervical Lymph Nodes Enlarged Anterior Bilaterally] : anterior cervical [Supraclavicular Lymph Nodes Enlarged Bilaterally] : supraclavicular [Axillary Lymph Nodes Enlarged Bilaterally] : axillary [No CVA Tenderness] : no ~M costovertebral angle tenderness [No Spinal Tenderness] : no spinal tenderness [Skin Color & Pigmentation] : normal skin color and pigmentation [Skin Turgor] : normal skin turgor [] : no rash [No Focal Deficits] : no focal deficits [Oriented To Time, Place, And Person] : oriented to person, place, and time [Impaired Insight] : insight and judgment were intact [Affect] : the affect was normal [FreeTextEntry1] : OA changes in the hands. TTP over the joints of the hands. Bilateral trochanteric bursa tenderness. TTP of th eplantar fascia, preserved ankle ROM

## 2022-09-13 NOTE — HISTORY OF PRESENT ILLNESS
[New/worsening skin psoriasis] : new/worsening skin psoriasis [Joint Pain] : joint pain [Joint stiffness] : joint stiffness [Currently Experiencing] : currently [Fatigue] : fatigue [Arthralgias] : arthralgias [Decreased ROM] : decreased range of motion [de-identified] : 1.5 years ago [FreeTextEntry1] : Has been on Xeljanz which has been helping until a few weeks ago she started to notice severe pain and swelling in the feet particularly L foot and also feels numb in the toes bilaterally. Saw a podiatrist who gave her a steroid injection in the L foot.  Also feels more pain in the L thumb as well as the R hand  [Radiographic damage] : no radiographic damage [Anorexia] : no anorexia [Weight Loss] : no weight loss [Malaise] : no malaise [Fever] : no fever [Chills] : no chills [Depression] : no depression [Malar Facial Rash] : no malar facial rash [Skin Lesions] : no lesions [Skin Nodules] : no skin nodules [Oral Ulcers] : no oral ulcers [Cough] : no cough [Dry Mouth] : no dry mouth [Dysphonia] : no dysphonia [Dysphagia] : no dysphagia [Shortness of Breath] : no shortness of breath [Chest Pain] : no chest pain [Joint Swelling] : no joint swelling [Joint Warmth] : no joint warmth [Joint Deformity] : no joint deformity [Morning Stiffness] : no morning stiffness [Falls] : no falls [Difficulty Standing] : no difficulty standing [Difficulty Walking] : no difficulty walking [Dyspnea] : no dyspnea [Myalgias] : no myalgias [Muscle Weakness] : no muscle weakness [Muscle Spasms] : no muscle spasms [Muscle Cramping] : no muscle cramping [Visual Changes] : no visual changes [Eye Pain] : no eye pain [Eye Redness] : no eye redness [Dry Eyes] : no dry eyes [TextBox_2] : 2018 [TextBox_9] : Otezla [TextBox_13] : Cimzia, Taltz [TextBox_15] : Xeljanz

## 2022-09-13 NOTE — ASSESSMENT
[FreeTextEntry1] : 72 year old female here for follow up for OA and possible PsA\par \par 1. Possible PsA: has one instance of rash (possibly psoriasis but never confirmed) and recent joint pains which may be related to OA or enthesitis or both. No evidence of inflammatory arthritis or dactylitis. No erosions or joint damage on x-ray. On MRI of the L ankle and foot there is evidence of OA and entheseal changes that possibly reflects enthesitis.  Has had some improvement with Xeljanz 5 mg BID but continues to have flares on and off (appr. 2 days per month). Also on Amitriptyline 75 mg daily to address her OA pain. Will continue the same for now.  Repeat labs today - CBC, CMP and inflammatory markers. \par \par 2. US of the ankles ordered to evaluate for inflammation. Advised night splints for plantar fasciitis and shoe inserts. Consider switching tsDMARD if her symptoms do not improve with conservative management and US shows inflammatory changes. \par \par 3. CMC joint pain is consistent with OA. Repeat hand x-rays. Prescribed Diclofenac 1% gel to be applied to affected areas 4 times daily as needed.\par \par 4. Bone health: Needs to repeat DEXA. Will refer at next visit. Previously not inclined to start treatment. \par \par Follow up in 3 months

## 2022-09-20 ENCOUNTER — RX RENEWAL (OUTPATIENT)
Age: 72
End: 2022-09-20

## 2022-09-20 ENCOUNTER — APPOINTMENT (OUTPATIENT)
Dept: RADIOLOGY | Facility: CLINIC | Age: 72
End: 2022-09-20

## 2022-09-20 PROCEDURE — 73130 X-RAY EXAM OF HAND: CPT | Mod: 50

## 2022-09-23 LAB
ALBUMIN SERPL ELPH-MCNC: 4.8 G/DL
ALP BLD-CCNC: 117 U/L
ALT SERPL-CCNC: 28 U/L
ANION GAP SERPL CALC-SCNC: 12 MMOL/L
AST SERPL-CCNC: 25 U/L
BASOPHILS # BLD AUTO: 0.05 K/UL
BASOPHILS NFR BLD AUTO: 0.8 %
BILIRUB SERPL-MCNC: 0.2 MG/DL
BUN SERPL-MCNC: 16 MG/DL
CALCIUM SERPL-MCNC: 10 MG/DL
CHLORIDE SERPL-SCNC: 101 MMOL/L
CO2 SERPL-SCNC: 27 MMOL/L
CREAT SERPL-MCNC: 0.78 MG/DL
CRP SERPL-MCNC: <3 MG/L
EGFR: 80 ML/MIN/1.73M2
EOSINOPHIL # BLD AUTO: 0.16 K/UL
EOSINOPHIL NFR BLD AUTO: 2.7 %
ERYTHROCYTE [SEDIMENTATION RATE] IN BLOOD BY WESTERGREN METHOD: < 2 MM/HR
GLUCOSE SERPL-MCNC: 111 MG/DL
HCT VFR BLD CALC: 42.8 %
HGB BLD-MCNC: 14.4 G/DL
IMM GRANULOCYTES NFR BLD AUTO: 0.5 %
LYMPHOCYTES # BLD AUTO: 1.74 K/UL
LYMPHOCYTES NFR BLD AUTO: 29.1 %
MAN DIFF?: NORMAL
MCHC RBC-ENTMCNC: 31.7 PG
MCHC RBC-ENTMCNC: 33.6 GM/DL
MCV RBC AUTO: 94.3 FL
MONOCYTES # BLD AUTO: 0.89 K/UL
MONOCYTES NFR BLD AUTO: 14.9 %
NEUTROPHILS # BLD AUTO: 3.11 K/UL
NEUTROPHILS NFR BLD AUTO: 52 %
PLATELET # BLD AUTO: 254 K/UL
POTASSIUM SERPL-SCNC: 4.4 MMOL/L
PROT SERPL-MCNC: 7 G/DL
RBC # BLD: 4.54 M/UL
RBC # FLD: 12.5 %
SODIUM SERPL-SCNC: 140 MMOL/L
WBC # FLD AUTO: 5.98 K/UL

## 2022-10-21 ENCOUNTER — APPOINTMENT (OUTPATIENT)
Dept: ULTRASOUND IMAGING | Facility: CLINIC | Age: 72
End: 2022-10-21
Payer: MEDICARE

## 2022-10-21 PROCEDURE — 76881 US COMPL JOINT R-T W/IMG: CPT | Mod: 59,LT

## 2022-11-01 LAB
ALBUMIN SERPL ELPH-MCNC: 4.4 G/DL
ALP BLD-CCNC: 106 U/L
ALT SERPL-CCNC: 20 U/L
ANION GAP SERPL CALC-SCNC: 13 MMOL/L
AST SERPL-CCNC: 20 U/L
BASOPHILS # BLD AUTO: 0.06 K/UL
BASOPHILS NFR BLD AUTO: 1 %
BILIRUB SERPL-MCNC: 0.3 MG/DL
BUN SERPL-MCNC: 17 MG/DL
CALCIUM SERPL-MCNC: 9.8 MG/DL
CHLORIDE SERPL-SCNC: 102 MMOL/L
CO2 SERPL-SCNC: 24 MMOL/L
CREAT SERPL-MCNC: 0.78 MG/DL
CRP SERPL-MCNC: <3 MG/L
EGFR: 80 ML/MIN/1.73M2
EOSINOPHIL # BLD AUTO: 0.26 K/UL
EOSINOPHIL NFR BLD AUTO: 4.1 %
ERYTHROCYTE [SEDIMENTATION RATE] IN BLOOD BY WESTERGREN METHOD: 4 MM/HR
GLUCOSE SERPL-MCNC: 111 MG/DL
HAV IGM SER QL: NONREACTIVE
HBV CORE IGG+IGM SER QL: NONREACTIVE
HBV CORE IGM SER QL: NONREACTIVE
HBV SURFACE AG SER QL: NONREACTIVE
HCT VFR BLD CALC: 41.8 %
HCV AB SER QL: NONREACTIVE
HCV S/CO RATIO: 0.17 S/CO
HGB BLD-MCNC: 14.1 G/DL
IMM GRANULOCYTES NFR BLD AUTO: 0.3 %
LYMPHOCYTES # BLD AUTO: 1.77 K/UL
LYMPHOCYTES NFR BLD AUTO: 28.1 %
MAN DIFF?: NORMAL
MCHC RBC-ENTMCNC: 31.8 PG
MCHC RBC-ENTMCNC: 33.7 GM/DL
MCV RBC AUTO: 94.4 FL
MONOCYTES # BLD AUTO: 0.93 K/UL
MONOCYTES NFR BLD AUTO: 14.8 %
NEUTROPHILS # BLD AUTO: 3.26 K/UL
NEUTROPHILS NFR BLD AUTO: 51.7 %
PLATELET # BLD AUTO: 263 K/UL
POTASSIUM SERPL-SCNC: 4.3 MMOL/L
PROT SERPL-MCNC: 6.6 G/DL
RBC # BLD: 4.43 M/UL
RBC # FLD: 12.4 %
SODIUM SERPL-SCNC: 139 MMOL/L
WBC # FLD AUTO: 6.3 K/UL

## 2022-11-01 RX ORDER — TOFACITINIB 5 MG/1
5 TABLET, FILM COATED ORAL
Qty: 60 | Refills: 0 | Status: DISCONTINUED | COMMUNITY
Start: 2020-08-19 | End: 2022-11-01

## 2022-11-02 LAB
M TB IFN-G BLD-IMP: NEGATIVE
QUANTIFERON TB PLUS MITOGEN MINUS NIL: 3.91 IU/ML
QUANTIFERON TB PLUS NIL: 0.02 IU/ML
QUANTIFERON TB PLUS TB1 MINUS NIL: -0.01 IU/ML
QUANTIFERON TB PLUS TB2 MINUS NIL: -0.01 IU/ML

## 2022-12-27 ENCOUNTER — NON-APPOINTMENT (OUTPATIENT)
Age: 72
End: 2022-12-27

## 2023-01-10 ENCOUNTER — APPOINTMENT (OUTPATIENT)
Dept: RHEUMATOLOGY | Facility: CLINIC | Age: 73
End: 2023-01-10
Payer: MEDICARE

## 2023-01-10 VITALS
OXYGEN SATURATION: 97 % | BODY MASS INDEX: 25.83 KG/M2 | HEIGHT: 65 IN | WEIGHT: 155 LBS | SYSTOLIC BLOOD PRESSURE: 137 MMHG | RESPIRATION RATE: 16 BRPM | HEART RATE: 76 BPM | DIASTOLIC BLOOD PRESSURE: 78 MMHG | TEMPERATURE: 97.2 F

## 2023-01-10 PROCEDURE — 99214 OFFICE O/P EST MOD 30 MIN: CPT

## 2023-01-10 NOTE — HISTORY OF PRESENT ILLNESS
[___ Month(s) Ago] : [unfilled] month(s) ago [New/worsening skin psoriasis] : new/worsening skin psoriasis [Joint Pain] : joint pain [Joint stiffness] : joint stiffness [Currently Experiencing] : currently [Fatigue] : fatigue [Arthralgias] : arthralgias [Decreased ROM] : decreased range of motion [FreeTextEntry1] : Started Cosentyx on Nov 26th and took 3 doses till Dec 10th. Skipped dose on Dec 17th due to COVID. Took her fourth dose June singh and after developed diarrhea and dry heaves followed by dizziness for a couple days. Fatigue ensued and continues to feel tired. She took her fifth and final induction dose on Jan 7th - no symptoms of diarrhea, vomiting, dizziness. \par \par Noticed a recurrence of the psoriasis on the extremities when she stopped Xeljanz. Now lesions are slowly improving on Cosentyx\par  [Radiographic damage] : no radiographic damage [Anorexia] : no anorexia [Weight Loss] : no weight loss [Malaise] : no malaise [Fever] : no fever [Chills] : no chills [Depression] : no depression [Malar Facial Rash] : no malar facial rash [Skin Lesions] : no lesions [Skin Nodules] : no skin nodules [Oral Ulcers] : no oral ulcers [Cough] : no cough [Dry Mouth] : no dry mouth [Dysphonia] : no dysphonia [Dysphagia] : no dysphagia [Shortness of Breath] : no shortness of breath [Chest Pain] : no chest pain [Joint Swelling] : no joint swelling [Joint Warmth] : no joint warmth [Joint Deformity] : no joint deformity [Morning Stiffness] : no morning stiffness [Falls] : no falls [Difficulty Standing] : no difficulty standing [Difficulty Walking] : no difficulty walking [Dyspnea] : no dyspnea [Myalgias] : no myalgias [Muscle Weakness] : no muscle weakness [Muscle Spasms] : no muscle spasms [Muscle Cramping] : no muscle cramping [Visual Changes] : no visual changes [Eye Pain] : no eye pain [Eye Redness] : no eye redness [Dry Eyes] : no dry eyes [TextBox_2] : 2018 [TextBox_9] : Otezla [TextBox_13] : Cimzia, Taltz [TextBox_15] : Xeljanz

## 2023-01-10 NOTE — PHYSICAL EXAM
[General Appearance - Alert] : alert [General Appearance - In No Acute Distress] : in no acute distress [Sclera] : the sclera and conjunctiva were normal [PERRL With Normal Accommodation] : pupils were equal in size, round, and reactive to light [Extraocular Movements] : extraocular movements were intact [Outer Ear] : the ears and nose were normal in appearance [Oropharynx] : the oropharynx was normal [Neck Appearance] : the appearance of the neck was normal [Neck Cervical Mass (___cm)] : no neck mass was observed [Jugular Venous Distention Increased] : there was no jugular-venous distention [Thyroid Diffuse Enlargement] : the thyroid was not enlarged [Thyroid Nodule] : there were no palpable thyroid nodules [] : no respiratory distress [Auscultation Breath Sounds / Voice Sounds] : lungs were clear to auscultation bilaterally [Heart Rate And Rhythm] : heart rate was normal and rhythm regular [Heart Sounds] : normal S1 and S2 [Heart Sounds Gallop] : no gallops [Murmurs] : no murmurs [Heart Sounds Pericardial Friction Rub] : no pericardial rub [Cervical Lymph Nodes Enlarged Posterior Bilaterally] : posterior cervical [Cervical Lymph Nodes Enlarged Anterior Bilaterally] : anterior cervical [Supraclavicular Lymph Nodes Enlarged Bilaterally] : supraclavicular [Axillary Lymph Nodes Enlarged Bilaterally] : axillary [No CVA Tenderness] : no ~M costovertebral angle tenderness [No Spinal Tenderness] : no spinal tenderness [Skin Color & Pigmentation] : normal skin color and pigmentation [Skin Turgor] : normal skin turgor [No Focal Deficits] : no focal deficits [Oriented To Time, Place, And Person] : oriented to person, place, and time [Impaired Insight] : insight and judgment were intact [Affect] : the affect was normal [FreeTextEntry1] : scattered psoriatic lesions on the lower extremities

## 2023-01-10 NOTE — ASSESSMENT
[FreeTextEntry1] : 72 year old female here for follow up for OA and possible PsA\par \par 1. Possible PsA: has psoriasis and recent joint pains which may be related to OA or enthesitis or both. No evidence of inflammatory arthritis or dactylitis. No erosions or joint damage on x-ray. On MRI of the L ankle and foot there is evidence of OA and entheseal changes that possibly reflects enthesitis.  Had some improvement with Xeljanz 5 mg BID but continued to have flares on and off thus switched to Cosentyx in Nov 2022. Has completed 5 doses of induction and will continue monthly injection for maintenance. Recent diarrhea and dizziness after the 4th dose - unclear if related as it has not recurred on the fifth dose. WIll monitor for now. Repeat labs today. Continue Amitriptyline 75 mg daily to address her OA pain. \par \par 2. US of the ankles with inflammatory changes concerning for PsA - now improved. \par \par 3. CMC joint pain is consistent with OA. Repeat hand x-rays. Prescribed Diclofenac 1% gel to be applied to affected areas 4 times daily as needed.\par \par 4. Bone health: Needs to repeat DEXA. Will refer at next visit. Previously not inclined to start treatment. \par \par Follow up in 3 months

## 2023-01-10 NOTE — DATA REVIEWED
[FreeTextEntry1] : Bilateral Ankle US (10/2022): RIGHT: Anterior Extensor tendons: The anterior tibialis, extensor hallucis longus, and extensor digitorum longus tendons are intact. Anterior tibiotalar joint recess: No significant joint effusion or synovial hypertrophy is identified in the anterior tibiotalar joint recess. Talonavicular joint: Small joint effusion with synovial proliferation is seen arising from the talonavicular joint without hyperemia. Dorsalis pedis artery: The dorsalis pedis artery is patent with normal forward flow. Medial\par Medial flexor tendons: Mild tenosynovitis of the posterior tibialis tendon. The posterior tibialis, flexor digitorum longus, and flexor hallucis longus tendons are intact. Deltoid ligaments: Intact Lateral Peroneal tendons: The peroneus longus and brevis tendons are intact. Anterior talofibular ligament: There is a 1 mm area of hypoechoic signal suggesting low-grade tear (1.58). Posterior Achilles tendon: The Achilles tendon is intact. Retrocalcaneal bursa: No fluid is identified in the retrocalcaneal bursa. Plantar fascia: Partial-thickness tear of the plantar fascia at the attachment of the plantar aponeurosis on the calcaneus.\par LEFT: Anterior Extensor tendons: The anterior tibialis, extensor hallucis longus, and extensor digitorum longus tendons are intact. Anterior tibiotalar joint recess: No significant joint effusion or synovial  hypertrophy is identified in the anterior tibiotalar joint recess. Dorsalis pedis artery: The dorsalis pedis artery is patent with normal forward flow. Talonavicular joint: Moderate joint effusion with synovial proliferation is seen arising from the talonavicular joint without hyperemia. Medial Medial flexor tendons: Tenosynovitis of the posterior tibialis and flexor digitorum longus tendons. The posterior tibialis, flexor digitorum longus, and flexor hallucis longus tendons are intact. Deltoid ligaments: Intact  Lateral\par Peroneal tendons: Longitudinal split tear of the peroneus brevis tendon. Anterior talofibular ligament: Intact.\par Posterior Achilles tendon: The Achilles tendon is intact. Retrocalcaneal bursa: No fluid is identified in the retrocalcaneal bursa. Plantar fascia: Intact. Focal swelling suspected to represent plantar fibromas are identified along the course of the plantar fascia. The largest fibroma measures 0.9 cm is located approximately 2.1 cm from the calcaneal attachment. A smaller fibroma measuring 0.4 cm is seen 4.3 cm from the calcaneal attachment. IMPRESSION: 1. Joint effusion and synovial proliferation in the bilateral talonavicular joints, left greater than right. 2. Mild tenosynovitis of the bilateral posterior tibialis tendons.\par 3. Partial-thickness tear of the right plantar fascia. 4. Plantar fibromas arising from the left plantar fascia.\par 5. Suspected low-grade tear of the right ATFL. 6. Longitudinal split tear of the left peroneus brevis tendon.\par \par MRI L Foot (06/2020): MUSCLES AND TENDONS: The tendons are intact. No full-thickness tendon tear \par or retraction is noted. The intrinsic musculature the foot is preserved. No muscle edema noted. \par PLANTAR FASCIA: The distal plantar fascia is preserved. LIGAMENTS: Intact Lisfranc ligament. Mild attenuation of the medial plantar plate of the third digit. No acute plantar plate tear. CARTILAGE and SUBCHONDRAL BONE: Moderate to severe degenerative changes the first metatarsophalangeal joint. Several nonspecific cysts are seen at the medial first metatarsal head. Some of these may reflect periarticular erosions. There is cartilage loss and subchondral cystic change at the third TMT \par SYNOVIUM/JOINT FLUID: There is a small first metatarsophalangeal joint effusion. Small fluid present from the second through fifth metatarsophalangeal joints. Intermetatarsal bursitis noted at the first through third web spaces. MARROW: There is no fracture. NEUROVASCULAR STRUCTURES: Preserved PERIPHERAL/ SUBCUTANEOUS SOFT TISSUES: No significant soft tissue swelling. IMPRESSION: 1. Severe DJD at the first MTP. Additional degenerative changes as above. 2. Possible periarticular erosion at the first metatarsal head that can be seen with an inflammatory arthropathy such as gout. Correlate clinically. \par \par L ankle MRI (06/2020): Bones: There is mild subchondral signal abnormality at the lateral talar \par dome with an overlying chondral fissure. Joints: There is mild third and fourth tarsometatarsal osteoarthritis. There is scarring within the sinus Tarsi with a 1.8 x 1.0 cm ganglion cyst extending dorsal laterally which contacts the undersurface of the extensor hallucis longus and digitorum tendons (9, 17 and 5, 16 and 18). Ligaments: Anterior talofibular, calcaneofibular and posterior talofibular ligaments are intact. The distal syndesmotic ligaments are intact. The deltoid ligament is intact. Muscles and Tendons: There is short segment chronic longitudinal split tearing of peroneal brevis at the lateral malleolus (4, 20) with reconstitution at the level the distal calcaneus. The medial long flexor and anterior extensor tendons appear unremarkable. The Achilles tendon is intact. Plantar Fascia: Minimal intermediate T2 signal within the middle cord of the plantar fascia at the origin laterally without thickening of the middle cord likely related to a small subacute to chronic tear. Thickening of the middle cord of the plantar fascia 2.6 cm from the origin which is also chronic. There is no adjacent soft tissue edema. Neurovascular: Normal. \par Soft Tissues: Mild lateral greater than medial subcutaneous edema. Impression: Scarring at the sinus Tarsi with a 1.8 x 1.0 cm ganglion cyst extending dorsal laterally which contacts the undersurface of the extensor hallucis longus and extensor digitorum tendons. Chronic short segment longitudinal split tear of peroneal brevis. Intermediate intrasubstance signal at the origin the middle cord of the plantar fascia with mild thickening distally which is likely subacute to chronic and chronic respectively. Chondral fissure with mild subchondral signal abnormality at the lateral talar dome. Mild third and fourth tarsometatarsal osteoarthrosis. \par \par Bilateral foor x-ray (05/2020): No fractures or dislocations. Tarsometatarsal alignment maintained without evidence for a Lisfranc injury. Bilateral hallux valgus deformities with associated bunions. Suggestion of a \par tiny well marginated erosion along left 1st MTP distal lateral articular margin on the frontal view. Otherwise preserved visualized joint spaces and no additional suspected joint margin erosions. No lytic or blastic lesions. \par \par Hand x-ray (05/2020): No fractures or dislocations. Varying degrees of osteoarthritis involving the bilateral STT 1st CMC and right IP joints. Preserved remaining joint spaces and no joint margin erosions. \par Carpal bones normally aligned. Neutral ulnar variance. Generalized osteopenia otherwise no discrete lytic or blastic lesions. Cervical Spine x-ray (05/2020): No compression fractures. Multilevel mid and lower cervical predominant degenerative disease manifest by varying degrees of disc space narrowing with disc margin osteophyte formation and posterior facet arthrosis. Degree of degenerative disc changes \par most severe at C5-C6 C6-C7 and C7-T1 levels. Straightened lordosis could be related to muscle spasm and/or the underlying degenerative change. Slight C3 on C4 and C4 on C5 anterolisthesis. Remaining vertebral body alignment maintained. Prevertebral soft tissues and predental interval within normal limits. \par Atlantoaxial and posterior facet alignment maintained. Intact odontoid. Generalized osteopenia otherwise no discrete lytic or blastic lesions. Clear visualized lung apices and midline normal caliber trachea. \par \par Lumbar spine x-ray (05/2020): Broad slight lumbar levocurvature. No compression fractures. Multilevel degenerative disease manifest by varying degrees of disc space narrowing with disc margin osteophyte formation and posterior facet arthrosis. Grade 1 anterolisthesis of L3 on L4, grade 1-2 anterolisthesis of L4 on L5, and slight L1 on L2 and L2 on L3 retrolistheses however without associated spondylolysis defects. Remaining vertebral body alignment maintained. Unremarkable SI joints and partially visualized hips. No lytic or blastic lesions. Right upper quadrant surgical clips present. \par

## 2023-01-17 LAB
ALBUMIN SERPL ELPH-MCNC: 4.6 G/DL
ALP BLD-CCNC: 124 U/L
ALT SERPL-CCNC: 18 U/L
ANION GAP SERPL CALC-SCNC: 13 MMOL/L
APPEARANCE: CLEAR
AST SERPL-CCNC: 18 U/L
BASOPHILS # BLD AUTO: 0.06 K/UL
BASOPHILS NFR BLD AUTO: 0.9 %
BILIRUB SERPL-MCNC: 0.2 MG/DL
BILIRUBIN URINE: NEGATIVE
BLOOD URINE: NEGATIVE
BUN SERPL-MCNC: 20 MG/DL
CALCIUM SERPL-MCNC: 10.2 MG/DL
CHLORIDE SERPL-SCNC: 103 MMOL/L
CK SERPL-CCNC: 118 U/L
CO2 SERPL-SCNC: 24 MMOL/L
COLOR: COLORLESS
CREAT SERPL-MCNC: 0.81 MG/DL
CREAT SPEC-SCNC: 17 MG/DL
CREAT/PROT UR: NORMAL RATIO
CRP SERPL-MCNC: <3 MG/L
DEPRECATED KAPPA LC FREE/LAMBDA SER: 0.88 RATIO
EGFR: 77 ML/MIN/1.73M2
EOSINOPHIL # BLD AUTO: 0.29 K/UL
EOSINOPHIL NFR BLD AUTO: 4.5 %
ERYTHROCYTE [SEDIMENTATION RATE] IN BLOOD BY WESTERGREN METHOD: 6 MM/HR
GLUCOSE QUALITATIVE U: NEGATIVE
GLUCOSE SERPL-MCNC: 114 MG/DL
HCT VFR BLD CALC: 44.1 %
HGB BLD-MCNC: 14.2 G/DL
IGA SER QL IEP: 148 MG/DL
IGG SER QL IEP: 988 MG/DL
IGM SER QL IEP: 138 MG/DL
IMM GRANULOCYTES NFR BLD AUTO: 0.9 %
KAPPA LC CSF-MCNC: 2 MG/DL
KAPPA LC SERPL-MCNC: 1.77 MG/DL
KETONES URINE: NEGATIVE
LEUKOCYTE ESTERASE URINE: NEGATIVE
LYMPHOCYTES # BLD AUTO: 1.96 K/UL
LYMPHOCYTES NFR BLD AUTO: 30.2 %
MAN DIFF?: NORMAL
MCHC RBC-ENTMCNC: 30.3 PG
MCHC RBC-ENTMCNC: 32.2 GM/DL
MCV RBC AUTO: 94.2 FL
MONOCYTES # BLD AUTO: 0.96 K/UL
MONOCYTES NFR BLD AUTO: 14.8 %
NEUTROPHILS # BLD AUTO: 3.16 K/UL
NEUTROPHILS NFR BLD AUTO: 48.7 %
NITRITE URINE: NEGATIVE
PH URINE: 6.5
PLATELET # BLD AUTO: 272 K/UL
POTASSIUM SERPL-SCNC: 4.2 MMOL/L
PROT SERPL-MCNC: 6.9 G/DL
PROT UR-MCNC: <4 MG/DL
PROTEIN URINE: NEGATIVE
RBC # BLD: 4.68 M/UL
RBC # FLD: 12.6 %
SODIUM SERPL-SCNC: 141 MMOL/L
SPECIFIC GRAVITY URINE: 1.01
UROBILINOGEN URINE: NORMAL
WBC # FLD AUTO: 6.49 K/UL

## 2023-03-29 ENCOUNTER — APPOINTMENT (OUTPATIENT)
Dept: RHEUMATOLOGY | Facility: CLINIC | Age: 73
End: 2023-03-29
Payer: MEDICARE

## 2023-03-29 VITALS
DIASTOLIC BLOOD PRESSURE: 80 MMHG | BODY MASS INDEX: 25.83 KG/M2 | OXYGEN SATURATION: 98 % | WEIGHT: 155 LBS | HEART RATE: 76 BPM | TEMPERATURE: 97.6 F | HEIGHT: 65 IN | SYSTOLIC BLOOD PRESSURE: 148 MMHG

## 2023-03-29 PROCEDURE — 99214 OFFICE O/P EST MOD 30 MIN: CPT

## 2023-03-29 NOTE — ASSESSMENT
[FreeTextEntry1] : 72 year old female here for follow up for OA and possible PsA\par \par 1. Possible PsA: has psoriasis and recent joint pains which may be related to OA or enthesitis or both. No evidence of inflammatory arthritis or dactylitis. No erosions or joint damage on x-ray. On MRI of the L ankle and foot there is evidence of OA and entheseal changes that possibly reflects enthesitis.  Had some improvement with Xeljanz 5 mg BID but continued to have flares on and off thus switched to Cosentyx in Nov 2022 with 60% improvement in symptoms. Repeat labs today. Continue Amitriptyline 75 mg daily to address her OA pain. \par \par 2. US of the ankles with inflammatory changes concerning for PsA - now improved. \par \par 3. CMC joint pain is consistent with OA. Repeat hand x-rays. Prescribed Diclofenac 1% gel to be applied to affected areas 4 times daily as needed.\par \par 4. Bone health: Needs to repeat DEXA. Will refer at next visit. Previously not inclined to start treatment. \par \par Follow up in 3 months

## 2023-03-29 NOTE — HISTORY OF PRESENT ILLNESS
[___ Month(s) Ago] : [unfilled] month(s) ago [New/worsening skin psoriasis] : new/worsening skin psoriasis [Joint Pain] : joint pain [Joint stiffness] : joint stiffness [Currently Experiencing] : currently [Fatigue] : fatigue [Arthralgias] : arthralgias [Decreased ROM] : decreased range of motion [FreeTextEntry1] : No more diarrhea with Cosentyx injection. Thinks that the medication has helped appr. 60%. Still having some achiness in the hands and also severe pain in the plantar fascia. [Radiographic damage] : no radiographic damage [Anorexia] : no anorexia [Weight Loss] : no weight loss [Malaise] : no malaise [Fever] : no fever [Chills] : no chills [Depression] : no depression [Malar Facial Rash] : no malar facial rash [Skin Lesions] : no lesions [Skin Nodules] : no skin nodules [Oral Ulcers] : no oral ulcers [Cough] : no cough [Dry Mouth] : no dry mouth [Dysphonia] : no dysphonia [Dysphagia] : no dysphagia [Shortness of Breath] : no shortness of breath [Chest Pain] : no chest pain [Joint Swelling] : no joint swelling [Joint Warmth] : no joint warmth [Joint Deformity] : no joint deformity [Morning Stiffness] : no morning stiffness [Falls] : no falls [Difficulty Standing] : no difficulty standing [Difficulty Walking] : no difficulty walking [Dyspnea] : no dyspnea [Myalgias] : no myalgias [Muscle Weakness] : no muscle weakness [Muscle Spasms] : no muscle spasms [Muscle Cramping] : no muscle cramping [Visual Changes] : no visual changes [Eye Pain] : no eye pain [Eye Redness] : no eye redness [Dry Eyes] : no dry eyes [TextBox_2] : 2018 [TextBox_9] : Otezla [TextBox_13] : Cimzia, Taltz [TextBox_15] : Xeljanz

## 2023-03-31 LAB
ALBUMIN SERPL ELPH-MCNC: 4.6 G/DL
ALP BLD-CCNC: 117 U/L
ALT SERPL-CCNC: 18 U/L
ANION GAP SERPL CALC-SCNC: 12 MMOL/L
AST SERPL-CCNC: 21 U/L
BASOPHILS # BLD AUTO: 0.06 K/UL
BASOPHILS NFR BLD AUTO: 0.6 %
BILIRUB SERPL-MCNC: 0.2 MG/DL
BUN SERPL-MCNC: 17 MG/DL
CALCIUM SERPL-MCNC: 10.2 MG/DL
CHLORIDE SERPL-SCNC: 101 MMOL/L
CO2 SERPL-SCNC: 25 MMOL/L
CREAT SERPL-MCNC: 0.81 MG/DL
CRP SERPL-MCNC: <3 MG/L
EGFR: 77 ML/MIN/1.73M2
EOSINOPHIL # BLD AUTO: 0.16 K/UL
EOSINOPHIL NFR BLD AUTO: 1.6 %
ERYTHROCYTE [SEDIMENTATION RATE] IN BLOOD BY WESTERGREN METHOD: 4 MM/HR
GLUCOSE SERPL-MCNC: 118 MG/DL
HCT VFR BLD CALC: 42.8 %
HGB BLD-MCNC: 14 G/DL
IMM GRANULOCYTES NFR BLD AUTO: 0.4 %
LYMPHOCYTES # BLD AUTO: 3.36 K/UL
LYMPHOCYTES NFR BLD AUTO: 33 %
MAN DIFF?: NORMAL
MCHC RBC-ENTMCNC: 30.4 PG
MCHC RBC-ENTMCNC: 32.7 GM/DL
MCV RBC AUTO: 92.8 FL
MONOCYTES # BLD AUTO: 1.29 K/UL
MONOCYTES NFR BLD AUTO: 12.7 %
NEUTROPHILS # BLD AUTO: 5.26 K/UL
NEUTROPHILS NFR BLD AUTO: 51.7 %
PLATELET # BLD AUTO: 269 K/UL
POTASSIUM SERPL-SCNC: 4 MMOL/L
PROT SERPL-MCNC: 7 G/DL
RBC # BLD: 4.61 M/UL
RBC # FLD: 12.7 %
SODIUM SERPL-SCNC: 138 MMOL/L
WBC # FLD AUTO: 10.17 K/UL

## 2023-06-13 ENCOUNTER — APPOINTMENT (OUTPATIENT)
Dept: RHEUMATOLOGY | Facility: CLINIC | Age: 73
End: 2023-06-13
Payer: MEDICARE

## 2023-06-13 VITALS
SYSTOLIC BLOOD PRESSURE: 153 MMHG | BODY MASS INDEX: 25.83 KG/M2 | OXYGEN SATURATION: 96 % | RESPIRATION RATE: 16 BRPM | HEIGHT: 65 IN | DIASTOLIC BLOOD PRESSURE: 81 MMHG | HEART RATE: 72 BPM | WEIGHT: 155 LBS | TEMPERATURE: 97.3 F

## 2023-06-13 DIAGNOSIS — Z13.820 ENCOUNTER FOR SCREENING FOR OSTEOPOROSIS: ICD-10-CM

## 2023-06-13 PROCEDURE — 99214 OFFICE O/P EST MOD 30 MIN: CPT

## 2023-06-13 NOTE — HISTORY OF PRESENT ILLNESS
[___ Month(s) Ago] : [unfilled] month(s) ago [New/worsening skin psoriasis] : new/worsening skin psoriasis [Joint Pain] : joint pain [Joint stiffness] : joint stiffness [Currently Experiencing] : currently [Fatigue] : fatigue [Arthralgias] : arthralgias [Decreased ROM] : decreased range of motion [FreeTextEntry1] : Noticed improvement with Cosentyx although dose have some breakthrough pain in the joints.  [Radiographic damage] : no radiographic damage [Anorexia] : no anorexia [Weight Loss] : no weight loss [Malaise] : no malaise [Fever] : no fever [Chills] : no chills [Depression] : no depression [Malar Facial Rash] : no malar facial rash [Skin Lesions] : no lesions [Skin Nodules] : no skin nodules [Oral Ulcers] : no oral ulcers [Cough] : no cough [Dry Mouth] : no dry mouth [Dysphonia] : no dysphonia [Dysphagia] : no dysphagia [Shortness of Breath] : no shortness of breath [Chest Pain] : no chest pain [Joint Swelling] : no joint swelling [Joint Warmth] : no joint warmth [Joint Deformity] : no joint deformity [Morning Stiffness] : no morning stiffness [Falls] : no falls [Difficulty Standing] : no difficulty standing [Difficulty Walking] : no difficulty walking [Dyspnea] : no dyspnea [Myalgias] : no myalgias [Muscle Weakness] : no muscle weakness [Muscle Spasms] : no muscle spasms [Muscle Cramping] : no muscle cramping [Visual Changes] : no visual changes [Eye Pain] : no eye pain [Eye Redness] : no eye redness [Dry Eyes] : no dry eyes [TextBox_2] : 2018 [TextBox_9] : Otezla [TextBox_13] : Cimzia, Taltz [TextBox_15] : Xeljanz

## 2023-06-13 NOTE — PHYSICAL EXAM
[General Appearance - Alert] : alert [General Appearance - In No Acute Distress] : in no acute distress [Sclera] : the sclera and conjunctiva were normal [PERRL With Normal Accommodation] : pupils were equal in size, round, and reactive to light [Extraocular Movements] : extraocular movements were intact [Outer Ear] : the ears and nose were normal in appearance [Oropharynx] : the oropharynx was normal [Neck Appearance] : the appearance of the neck was normal [Neck Cervical Mass (___cm)] : no neck mass was observed [Jugular Venous Distention Increased] : there was no jugular-venous distention [Thyroid Diffuse Enlargement] : the thyroid was not enlarged [Thyroid Nodule] : there were no palpable thyroid nodules [] : no respiratory distress [Auscultation Breath Sounds / Voice Sounds] : lungs were clear to auscultation bilaterally [Heart Rate And Rhythm] : heart rate was normal and rhythm regular [Heart Sounds] : normal S1 and S2 [Heart Sounds Gallop] : no gallops [Murmurs] : no murmurs [Heart Sounds Pericardial Friction Rub] : no pericardial rub [Cervical Lymph Nodes Enlarged Posterior Bilaterally] : posterior cervical [Cervical Lymph Nodes Enlarged Anterior Bilaterally] : anterior cervical [Supraclavicular Lymph Nodes Enlarged Bilaterally] : supraclavicular [Axillary Lymph Nodes Enlarged Bilaterally] : axillary [No CVA Tenderness] : no ~M costovertebral angle tenderness [No Spinal Tenderness] : no spinal tenderness [Skin Color & Pigmentation] : normal skin color and pigmentation [Skin Turgor] : normal skin turgor [No Focal Deficits] : no focal deficits [Oriented To Time, Place, And Person] : oriented to person, place, and time [Impaired Insight] : insight and judgment were intact [Affect] : the affect was normal [FreeTextEntry1] : OA changes in the hands, preserved ankle ROM. TTP of the R > L plantar area

## 2023-06-13 NOTE — ASSESSMENT
[FreeTextEntry1] : 72 year old female here for follow up for OA and possible PsA\par \par 1. Possible PsA: has psoriasis and recent joint pains which may be related to OA or enthesitis or both. No evidence of inflammatory arthritis or dactylitis. No erosions or joint damage on x-ray. On MRI of the L ankle and foot there is evidence of OA and entheseal changes that possibly reflects enthesitis.  Had some improvement with Xeljanz 5 mg BID but continued to have flares on and off thus switched to Cosentyx in Nov 2022 with 60% improvement in symptoms. Repeat labs today. Continue Amitriptyline 75 mg daily to address her OA pain. \par \par 2. US of the ankles with inflammatory changes concerning for PsA - now improved. \par \par 3. CMC joint pain is consistent with OA. Repeat hand x-rays. Prescribed Diclofenac 1% gel to be applied to affected areas 4 times daily as needed.\par \par 4. Bone health: Needs to repeat DEXA - ordered today. Previously not inclined to start treatment. \par \par Follow up in 3 months

## 2023-06-16 LAB
ALBUMIN SERPL ELPH-MCNC: 4.5 G/DL
ALP BLD-CCNC: 130 U/L
ALT SERPL-CCNC: 26 U/L
ANION GAP SERPL CALC-SCNC: 12 MMOL/L
AST SERPL-CCNC: 19 U/L
BILIRUB SERPL-MCNC: 0.4 MG/DL
BUN SERPL-MCNC: 15 MG/DL
CALCIUM SERPL-MCNC: 9.7 MG/DL
CHLORIDE SERPL-SCNC: 102 MMOL/L
CO2 SERPL-SCNC: 25 MMOL/L
CREAT SERPL-MCNC: 0.82 MG/DL
CRP SERPL-MCNC: <3 MG/L
EGFR: 75 ML/MIN/1.73M2
ERYTHROCYTE [SEDIMENTATION RATE] IN BLOOD BY WESTERGREN METHOD: 4 MM/HR
GLUCOSE SERPL-MCNC: 119 MG/DL
POTASSIUM SERPL-SCNC: 4.7 MMOL/L
PROT SERPL-MCNC: 6.8 G/DL
SODIUM SERPL-SCNC: 139 MMOL/L

## 2023-07-18 ENCOUNTER — APPOINTMENT (OUTPATIENT)
Dept: RHEUMATOLOGY | Facility: CLINIC | Age: 73
End: 2023-07-18

## 2023-09-12 ENCOUNTER — APPOINTMENT (OUTPATIENT)
Dept: RHEUMATOLOGY | Facility: CLINIC | Age: 73
End: 2023-09-12
Payer: MEDICARE

## 2023-09-12 VITALS
HEIGHT: 65 IN | SYSTOLIC BLOOD PRESSURE: 148 MMHG | BODY MASS INDEX: 25.99 KG/M2 | OXYGEN SATURATION: 97 % | TEMPERATURE: 97.1 F | DIASTOLIC BLOOD PRESSURE: 80 MMHG | WEIGHT: 156 LBS | RESPIRATION RATE: 16 BRPM | HEART RATE: 80 BPM

## 2023-09-12 PROCEDURE — 99214 OFFICE O/P EST MOD 30 MIN: CPT

## 2023-09-15 LAB
ALBUMIN SERPL ELPH-MCNC: 4.6 G/DL
ALP BLD-CCNC: 145 U/L
ALT SERPL-CCNC: 23 U/L
ANION GAP SERPL CALC-SCNC: 11 MMOL/L
AST SERPL-CCNC: 19 U/L
BILIRUB SERPL-MCNC: 0.2 MG/DL
BUN SERPL-MCNC: 18 MG/DL
CALCIUM SERPL-MCNC: 9.7 MG/DL
CHLORIDE SERPL-SCNC: 102 MMOL/L
CO2 SERPL-SCNC: 27 MMOL/L
CREAT SERPL-MCNC: 0.7 MG/DL
CRP SERPL-MCNC: <3 MG/L
EGFR: 91 ML/MIN/1.73M2
ERYTHROCYTE [SEDIMENTATION RATE] IN BLOOD BY WESTERGREN METHOD: 6 MM/HR
GLUCOSE SERPL-MCNC: 122 MG/DL
HCT VFR BLD CALC: 43.4 %
HGB BLD-MCNC: 14.9 G/DL
MCHC RBC-ENTMCNC: 30.8 PG
MCHC RBC-ENTMCNC: 34.3 GM/DL
MCV RBC AUTO: 89.7 FL
PLATELET # BLD AUTO: 249 K/UL
POTASSIUM SERPL-SCNC: 4.4 MMOL/L
PROT SERPL-MCNC: 6.8 G/DL
RBC # BLD: 4.84 M/UL
RBC # FLD: 12.5 %
SODIUM SERPL-SCNC: 139 MMOL/L
WBC # FLD AUTO: 10.06 K/UL

## 2024-01-09 ENCOUNTER — APPOINTMENT (OUTPATIENT)
Dept: RHEUMATOLOGY | Facility: CLINIC | Age: 74
End: 2024-01-09
Payer: MEDICARE

## 2024-01-09 VITALS
HEIGHT: 65 IN | WEIGHT: 155 LBS | SYSTOLIC BLOOD PRESSURE: 145 MMHG | DIASTOLIC BLOOD PRESSURE: 88 MMHG | BODY MASS INDEX: 25.83 KG/M2 | RESPIRATION RATE: 16 BRPM | OXYGEN SATURATION: 98 % | HEART RATE: 72 BPM | TEMPERATURE: 97.1 F

## 2024-01-09 PROCEDURE — 99214 OFFICE O/P EST MOD 30 MIN: CPT

## 2024-01-12 ENCOUNTER — TRANSCRIPTION ENCOUNTER (OUTPATIENT)
Age: 74
End: 2024-01-12

## 2024-01-12 LAB
ALBUMIN SERPL ELPH-MCNC: 4.6 G/DL
ALP BLD-CCNC: 148 U/L
ALT SERPL-CCNC: 22 U/L
ANION GAP SERPL CALC-SCNC: 11 MMOL/L
AST SERPL-CCNC: 19 U/L
BASOPHILS # BLD AUTO: 0.06 K/UL
BASOPHILS NFR BLD AUTO: 1 %
BILIRUB SERPL-MCNC: 0.3 MG/DL
BUN SERPL-MCNC: 14 MG/DL
CALCIUM SERPL-MCNC: 9.8 MG/DL
CHLORIDE SERPL-SCNC: 101 MMOL/L
CO2 SERPL-SCNC: 28 MMOL/L
CREAT SERPL-MCNC: 0.83 MG/DL
CRP SERPL-MCNC: <3 MG/L
DEPRECATED KAPPA LC FREE/LAMBDA SER: 1.03 RATIO
EGFR: 74 ML/MIN/1.73M2
EOSINOPHIL # BLD AUTO: 0.3 K/UL
EOSINOPHIL NFR BLD AUTO: 5.1 %
ERYTHROCYTE [SEDIMENTATION RATE] IN BLOOD BY WESTERGREN METHOD: < 2 MM/HR
GLUCOSE SERPL-MCNC: 120 MG/DL
HAV IGM SER QL: NONREACTIVE
HBV CORE IGG+IGM SER QL: NONREACTIVE
HBV CORE IGM SER QL: NONREACTIVE
HBV SURFACE AG SER QL: NONREACTIVE
HCT VFR BLD CALC: 45.8 %
HCV AB SER QL: NONREACTIVE
HCV S/CO RATIO: 0.12 S/CO
HGB BLD-MCNC: 15.2 G/DL
IGA SER QL IEP: 178 MG/DL
IGG SER QL IEP: 901 MG/DL
IGM SER QL IEP: 136 MG/DL
IMM GRANULOCYTES NFR BLD AUTO: 0.2 %
KAPPA LC CSF-MCNC: 1.72 MG/DL
KAPPA LC SERPL-MCNC: 1.77 MG/DL
LYMPHOCYTES # BLD AUTO: 1.91 K/UL
LYMPHOCYTES NFR BLD AUTO: 32.8 %
M TB IFN-G BLD-IMP: NEGATIVE
MAN DIFF?: NORMAL
MCHC RBC-ENTMCNC: 30.4 PG
MCHC RBC-ENTMCNC: 33.2 GM/DL
MCV RBC AUTO: 91.6 FL
MONOCYTES # BLD AUTO: 0.85 K/UL
MONOCYTES NFR BLD AUTO: 14.6 %
NEUTROPHILS # BLD AUTO: 2.7 K/UL
NEUTROPHILS NFR BLD AUTO: 46.3 %
PLATELET # BLD AUTO: 220 K/UL
POTASSIUM SERPL-SCNC: 4.1 MMOL/L
PROT SERPL-MCNC: 7 G/DL
QUANTIFERON TB PLUS MITOGEN MINUS NIL: >10 IU/ML
QUANTIFERON TB PLUS NIL: 0.02 IU/ML
QUANTIFERON TB PLUS TB1 MINUS NIL: 0 IU/ML
QUANTIFERON TB PLUS TB2 MINUS NIL: 0 IU/ML
RBC # BLD: 5 M/UL
RBC # FLD: 12.4 %
SODIUM SERPL-SCNC: 140 MMOL/L
WBC # FLD AUTO: 5.83 K/UL

## 2024-01-17 RX ORDER — SECUKINUMAB 150 MG/ML
150 INJECTION SUBCUTANEOUS
Qty: 5 | Refills: 0 | Status: DISCONTINUED | OUTPATIENT
Start: 2022-10-28 | End: 2024-01-17

## 2024-01-17 RX ORDER — SECUKINUMAB 150 MG/ML
150 INJECTION SUBCUTANEOUS
Qty: 1 | Refills: 3 | Status: DISCONTINUED | COMMUNITY
Start: 2022-11-01 | End: 2024-01-17

## 2024-01-18 RX ORDER — APREMILAST 10-20-30MG
10 & 20 & 30 KIT ORAL
Qty: 1 | Refills: 0 | Status: ACTIVE | COMMUNITY
Start: 2024-01-17 | End: 1900-01-01

## 2024-01-18 RX ORDER — APREMILAST 30 MG/1
30 TABLET, FILM COATED ORAL
Qty: 180 | Refills: 1 | Status: ACTIVE | COMMUNITY
Start: 2024-01-17 | End: 1900-01-01

## 2025-03-07 ENCOUNTER — APPOINTMENT (OUTPATIENT)
Dept: PULMONOLOGY | Facility: CLINIC | Age: 75
End: 2025-03-07
Payer: MEDICARE

## 2025-03-07 VITALS
HEART RATE: 79 BPM | WEIGHT: 155 LBS | OXYGEN SATURATION: 96 % | BODY MASS INDEX: 25.83 KG/M2 | DIASTOLIC BLOOD PRESSURE: 79 MMHG | SYSTOLIC BLOOD PRESSURE: 134 MMHG | HEIGHT: 65 IN

## 2025-03-07 PROBLEM — R91.8 MULTIPLE PULMONARY NODULES: Status: ACTIVE | Noted: 2025-03-07

## 2025-03-07 PROCEDURE — 94729 DIFFUSING CAPACITY: CPT

## 2025-03-07 PROCEDURE — 94010 BREATHING CAPACITY TEST: CPT

## 2025-03-07 PROCEDURE — ZZZZZ: CPT

## 2025-03-07 PROCEDURE — 99204 OFFICE O/P NEW MOD 45 MIN: CPT | Mod: 25

## 2025-03-07 PROCEDURE — 94726 PLETHYSMOGRAPHY LUNG VOLUMES: CPT

## 2025-03-14 ENCOUNTER — APPOINTMENT (OUTPATIENT)
Dept: PULMONOLOGY | Facility: CLINIC | Age: 75
End: 2025-03-14
Payer: MEDICARE

## 2025-03-14 VITALS
SYSTOLIC BLOOD PRESSURE: 147 MMHG | WEIGHT: 155 LBS | HEART RATE: 85 BPM | HEIGHT: 65 IN | OXYGEN SATURATION: 97 % | BODY MASS INDEX: 25.83 KG/M2 | DIASTOLIC BLOOD PRESSURE: 75 MMHG

## 2025-03-14 DIAGNOSIS — R06.02 SHORTNESS OF BREATH: ICD-10-CM

## 2025-03-14 DIAGNOSIS — R91.8 OTHER NONSPECIFIC ABNORMAL FINDING OF LUNG FIELD: ICD-10-CM

## 2025-03-14 PROCEDURE — 99214 OFFICE O/P EST MOD 30 MIN: CPT | Mod: 25

## 2025-03-14 PROCEDURE — 36415 COLL VENOUS BLD VENIPUNCTURE: CPT

## 2025-03-14 PROCEDURE — 94618 PULMONARY STRESS TESTING: CPT

## 2025-03-14 RX ORDER — BUDESONIDE AND FORMOTEROL FUMARATE DIHYDRATE 160; 4.5 UG/1; UG/1
160-4.5 AEROSOL RESPIRATORY (INHALATION) TWICE DAILY
Qty: 1 | Refills: 2 | Status: ACTIVE | COMMUNITY
Start: 2025-03-14 | End: 1900-01-01

## 2025-03-17 LAB — DEPRECATED D DIMER PPP IA-ACNC: 191 NG/ML DDU

## 2025-04-02 ENCOUNTER — NON-APPOINTMENT (OUTPATIENT)
Age: 75
End: 2025-04-02

## 2025-04-04 ENCOUNTER — APPOINTMENT (OUTPATIENT)
Dept: PULMONOLOGY | Facility: CLINIC | Age: 75
End: 2025-04-04
Payer: MEDICARE

## 2025-04-04 VITALS
OXYGEN SATURATION: 98 % | WEIGHT: 155 LBS | HEIGHT: 65 IN | SYSTOLIC BLOOD PRESSURE: 137 MMHG | HEART RATE: 76 BPM | BODY MASS INDEX: 25.83 KG/M2 | DIASTOLIC BLOOD PRESSURE: 86 MMHG

## 2025-04-04 DIAGNOSIS — R06.02 SHORTNESS OF BREATH: ICD-10-CM

## 2025-04-04 DIAGNOSIS — R91.8 OTHER NONSPECIFIC ABNORMAL FINDING OF LUNG FIELD: ICD-10-CM

## 2025-04-04 PROCEDURE — 94010 BREATHING CAPACITY TEST: CPT

## 2025-04-04 PROCEDURE — 99214 OFFICE O/P EST MOD 30 MIN: CPT | Mod: 25
